# Patient Record
Sex: MALE | Race: WHITE | NOT HISPANIC OR LATINO | Employment: UNEMPLOYED | URBAN - NONMETROPOLITAN AREA
[De-identification: names, ages, dates, MRNs, and addresses within clinical notes are randomized per-mention and may not be internally consistent; named-entity substitution may affect disease eponyms.]

---

## 2024-04-15 ENCOUNTER — OFFICE VISIT (OUTPATIENT)
Dept: URGENT CARE | Facility: CLINIC | Age: 54
End: 2024-04-15
Payer: COMMERCIAL

## 2024-04-15 ENCOUNTER — APPOINTMENT (OUTPATIENT)
Dept: RADIOLOGY | Facility: CLINIC | Age: 54
End: 2024-04-15
Payer: COMMERCIAL

## 2024-04-15 VITALS
HEART RATE: 108 BPM | OXYGEN SATURATION: 97 % | SYSTOLIC BLOOD PRESSURE: 122 MMHG | BODY MASS INDEX: 40.92 KG/M2 | DIASTOLIC BLOOD PRESSURE: 82 MMHG | RESPIRATION RATE: 20 BRPM | WEIGHT: 270 LBS | HEIGHT: 68 IN | TEMPERATURE: 98.4 F

## 2024-04-15 DIAGNOSIS — M79.641 RIGHT HAND PAIN: ICD-10-CM

## 2024-04-15 DIAGNOSIS — M79.641 RIGHT HAND PAIN: Primary | ICD-10-CM

## 2024-04-15 PROCEDURE — 99204 OFFICE O/P NEW MOD 45 MIN: CPT

## 2024-04-15 PROCEDURE — S9088 SERVICES PROVIDED IN URGENT: HCPCS

## 2024-04-15 PROCEDURE — 73130 X-RAY EXAM OF HAND: CPT

## 2024-04-15 NOTE — PROGRESS NOTES
[facility]        NAME: Eric Quinn is a 53 y.o. male  : 1970    MRN: [id number]  DATE: April 15, 2024  TIME: 4:12 PM    Assessment and Plan   Right hand pain [M79.641]  1. Right hand pain  XR hand 3+ vw right    Ambulatory Referral to Orthopedic Surgery        Preliminary x-ray read negative for acute osseous abnormality, final read pending.  Unclear etiology for patient's symptoms however likely arthritic component.  Defer oral steroids at present time. OTC Tylenol for pain. Referral placed to Orthopedic Surgery. Encouraged continued supportive measures.  Follow up with PCP in 3-5 days or proceed to emergency department for worsening symptoms.  Patient verbalized understanding of instructions given.       Patient Instructions     Patient Instructions     Preliminary XR read negative, final read pending  Rest, Ice, and Elevation  OTC Tylenol as needed for pain  Referral placed to Orthopedic Surgery   Follow up with PCP in 3-5 days.  Proceed to  ER if symptoms worsen.    If tests have been performed at [facility], our office will contact you with results if changes need to be made to the care plan discussed with you at the visit.  You can review your full results on [facility] MyChart.    Arthritis   AMBULATORY CARE:   Arthritis  is pain or disease in one or more joints. There are many types of arthritis. Types such as rheumatoid arthritis cause inflammation in the joints. Other types wear away the cartilage between joints, such as osteoarthritis. This makes the bones of the joint rub together when you move the joint. An infection from bacteria, a virus, or a fungus can also cause arthritis. Your symptoms may be constant, or symptoms may come and go. Arthritis often gets worse over time and can cause permanent joint damage.  Common signs and symptoms of arthritis:   Pain, swelling, or stiffness in the joint    Limited range of motion in the joint    Warmth or redness over the  joint    Tenderness when you touch the joint    Stiff joints in the morning that loosen with movement    A creaking or grinding sound when you move the joint    Fever    Call your doctor or rheumatologist if:   You have a fever and severe joint pain or swelling.    You cannot move the affected joint.    You have severe joint pain you cannot tolerate.    You have a new or worsening rash.    Your pain or swelling does not get better with treatment.    You have questions or concerns about your condition or care.    Treatment  will depend on the type of arthritis you have and if it is severe. You may need any of the following:  Acetaminophen  decreases pain and fever. It is available without a doctor's order. Ask how much to take and how often to take it. Follow directions. Read the labels of all other medicines you are using to see if they also contain acetaminophen, or ask your doctor or pharmacist. Acetaminophen can cause liver damage if not taken correctly.    NSAIDs , such as ibuprofen, help decrease swelling, pain, and fever. This medicine is available with or without a doctor's order. NSAIDs can cause stomach bleeding or kidney problems in certain people. If you take blood thinner medicine, always ask your healthcare provider if NSAIDs are safe for you. Always read the medicine label and follow directions.    Steroid medicine  helps reduce swelling and pain.     Prescription pain medicine  may be given. Ask your healthcare provider how to take this medicine safely. Some prescription pain medicines contain acetaminophen. Do not take other medicines that contain acetaminophen without talking to your healthcare provider. Too much acetaminophen may cause liver damage. Prescription pain medicine may cause constipation. Ask your healthcare provider how to prevent or treat constipation.     Surgery  may be needed to repair or replace a damaged joint.    Manage your symptoms:   Rest your painful joint so it can heal.   Your healthcare provider may recommend crutches or a walker if the affected joint is in a leg.    Apply ice or heat to the joint.  Both can help decrease swelling and pain. Ice may also help prevent tissue damage. Use an ice pack, or put crushed ice in a plastic bag. Cover it with a towel and place it on your joint for 15 to 20 minutes every hour or as directed. You can apply heat for 20 minutes every 2 hours. Heat treatment includes hot packs or heat lamps.    Elevate your joint.  Elevation helps reduce swelling and pain. Raise your joint above the level of your heart as often as you can. Prop your painful joint on pillows to keep it above your heart comfortably.       Manage arthritis:   Talk to your healthcare providers about your arthritis medicines.  Some medicines may only be needed when you have arthritis pain. You may need to take other medicines every day to prevent arthritis from getting worse. Your healthcare providers will help you understand all your medicines and when to take them. It is important to take the medicines as directed, even if you start to feel better. You can continue to have joint damage and inflammation even if you do not feel it.    Eat a variety of healthy foods.  Healthy foods include fruits, vegetables, whole-grain breads, low-fat dairy products, beans, lean meats, and fish. Ask if you need to be on a special diet. A diet rich in calcium and vitamin D may decrease your risk of osteoporosis. Foods high in calcium include milk, cheese, broccoli, and tofu. Vitamin D may be found in meat, fish, fortified milk, cereal and bread. Ask if you need calcium or vitamin D supplements.            Go to physical or occupational therapy as directed.  A physical therapist can teach you exercises to improve flexibility and range of motion. You may also be shown non-weight-bearing exercises that are safe for your joints, such as swimming. Exercise can help keep your joints flexible and reduce pain.  An occupational therapist can help you learn to do your daily activities when your joints are stiff or sore.    Maintain a healthy weight.  Extra weight puts increased pressure on your joints. Ask your healthcare provider what you should weigh. If you need to lose weight, he or she can help you create a weight loss program. Weight loss can help reduce pain and increase your ability to do your activities.    Wear flat or low-heeled shoes.  This will help decrease pain and reduce pressure on your ankle, knee, and hip joints.    Do not smoke.  Nicotine and other chemicals in cigarettes and cigars can damage your bones and joints. Ask your healthcare provider for information if you currently smoke and need help to quit. E-cigarettes or smokeless tobacco still contain nicotine. Talk to your healthcare provider before you use these products.    Support devices:   Orthotic shoes or insoles  help support your feet when you walk.    Crutches, a cane, or a walker  may help decrease your risk for falling. They also decrease stress on affected joints.    Devices to prevent falls  include raised toilet seats and bathtub bars to help you get up from sitting. Handrails can be placed in areas where you need balance and support.         Devices to help with support and rest  include splints to wear on your hands and a firm pillow while you sleep. Use a pillow that is firm enough to support your neck and head.    Follow up with your healthcare provider or rheumatologist as directed:  Write down your questions so you remember to ask them during your visits.  © Copyright [employer] 2023 Information is for End User's use only and may not be sold, redistributed or otherwise used for commercial purposes.  The above information is an  only. It is not intended as medical advice for individual conditions or treatments. Talk to your doctor, nurse or pharmacist before following any medical regimen to see if it is safe and  "effective for you.        Chief Complaint     Chief Complaint   Patient presents with    right ankle and hand swelling     Stated his right ankle and right wrist and hand swelled up 2 days ago. No injury         History of Present Illness       53-year-old male with a past medical history significant for renal cancer status post nephrectomy presents with complaints of right hand and wrist swelling and pain x 2 days.  Denies specific injury or trauma.  No redness.  States similar symptoms in left wrist and right ankle approximately 2 weeks ago that has since resolved.  He has been taking OTC ibuprofen with some relief but not applying heat or ice.  He is right-hand dominant.        Review of Systems   Review of Systems   Constitutional:  Negative for chills and fever.   Respiratory:  Negative for cough and shortness of breath.    Cardiovascular:  Negative for chest pain.   Gastrointestinal:  Negative for abdominal pain, diarrhea, nausea and vomiting.   Musculoskeletal:  Positive for arthralgias and joint swelling.   Skin:  Negative for color change.   Neurological:  Negative for weakness and numbness.         Current Medications     No current outpatient medications on file.    Current Allergies     Allergies as of 04/15/2024    (No Known Allergies)            The following portions of the patient's history were reviewed and updated as appropriate: allergies, current medications, past family history, past medical history, past social history, past surgical history and problem list.     Past Medical History:   Diagnosis Date    Cancer (HCC)     kidney 2012    H/O cervical fracture        Past Surgical History:   Procedure Laterality Date    HERNIA REPAIR      NEPHRECTOMY Left        Family History   Problem Relation Age of Onset    No Known Problems Mother          Medications have been verified.        Objective   /82   Pulse (!) 108   Temp 98.4 °F (36.9 °C)   Resp 20   Ht 5' 8\" (1.727 m)   Wt 122 kg (270 " lb)   SpO2 97%   BMI 41.05 kg/m²   No LMP for male patient.       Physical Exam     Physical Exam  Vitals and nursing note reviewed.   Constitutional:       General: He is not in acute distress.     Appearance: He is not toxic-appearing.   HENT:      Head: Normocephalic.      Nose: Nose normal.      Mouth/Throat:      Mouth: Mucous membranes are moist.      Pharynx: Oropharynx is clear.   Eyes:      Conjunctiva/sclera: Conjunctivae normal.   Pulmonary:      Effort: Pulmonary effort is normal.   Musculoskeletal:         General: Swelling and tenderness present.      Right forearm: Normal.      Right wrist: Swelling, tenderness and bony tenderness present. Decreased range of motion. Normal pulse.      Right hand: Swelling, tenderness and bony tenderness present. Normal strength. Normal sensation.      Comments: TTP diffusely over right wrist and right hand with associated swelling, no ecchymosis or erythema. Mildly reduced ROM due to pain. NV intact.    Skin:     General: Skin is warm and dry.      Findings: No erythema.   Neurological:      Mental Status: He is alert and oriented to person, place, and time.      Sensory: Sensation is intact.      Motor: Motor function is intact.      Gait: Gait is intact.   Psychiatric:         Mood and Affect: Mood normal.         Behavior: Behavior normal.

## 2024-04-15 NOTE — PATIENT INSTRUCTIONS
Preliminary XR read negative, final read pending  Rest, Ice, and Elevation  OTC Tylenol as needed for pain  Referral placed to Orthopedic Surgery   Follow up with PCP in 3-5 days.  Proceed to  ER if symptoms worsen.    If tests have been performed at [facility], our office will contact you with results if changes need to be made to the care plan discussed with you at the visit.  You can review your full results on [facility] MyChart.    Arthritis   AMBULATORY CARE:   Arthritis  is pain or disease in one or more joints. There are many types of arthritis. Types such as rheumatoid arthritis cause inflammation in the joints. Other types wear away the cartilage between joints, such as osteoarthritis. This makes the bones of the joint rub together when you move the joint. An infection from bacteria, a virus, or a fungus can also cause arthritis. Your symptoms may be constant, or symptoms may come and go. Arthritis often gets worse over time and can cause permanent joint damage.  Common signs and symptoms of arthritis:   Pain, swelling, or stiffness in the joint    Limited range of motion in the joint    Warmth or redness over the joint    Tenderness when you touch the joint    Stiff joints in the morning that loosen with movement    A creaking or grinding sound when you move the joint    Fever    Call your doctor or rheumatologist if:   You have a fever and severe joint pain or swelling.    You cannot move the affected joint.    You have severe joint pain you cannot tolerate.    You have a new or worsening rash.    Your pain or swelling does not get better with treatment.    You have questions or concerns about your condition or care.    Treatment  will depend on the type of arthritis you have and if it is severe. You may need any of the following:  Acetaminophen  decreases pain and fever. It is available without a doctor's order. Ask how much to take and how often to take it. Follow directions. Read the labels of all  other medicines you are using to see if they also contain acetaminophen, or ask your doctor or pharmacist. Acetaminophen can cause liver damage if not taken correctly.    NSAIDs , such as ibuprofen, help decrease swelling, pain, and fever. This medicine is available with or without a doctor's order. NSAIDs can cause stomach bleeding or kidney problems in certain people. If you take blood thinner medicine, always ask your healthcare provider if NSAIDs are safe for you. Always read the medicine label and follow directions.    Steroid medicine  helps reduce swelling and pain.     Prescription pain medicine  may be given. Ask your healthcare provider how to take this medicine safely. Some prescription pain medicines contain acetaminophen. Do not take other medicines that contain acetaminophen without talking to your healthcare provider. Too much acetaminophen may cause liver damage. Prescription pain medicine may cause constipation. Ask your healthcare provider how to prevent or treat constipation.     Surgery  may be needed to repair or replace a damaged joint.    Manage your symptoms:   Rest your painful joint so it can heal.  Your healthcare provider may recommend crutches or a walker if the affected joint is in a leg.    Apply ice or heat to the joint.  Both can help decrease swelling and pain. Ice may also help prevent tissue damage. Use an ice pack, or put crushed ice in a plastic bag. Cover it with a towel and place it on your joint for 15 to 20 minutes every hour or as directed. You can apply heat for 20 minutes every 2 hours. Heat treatment includes hot packs or heat lamps.    Elevate your joint.  Elevation helps reduce swelling and pain. Raise your joint above the level of your heart as often as you can. Prop your painful joint on pillows to keep it above your heart comfortably.       Manage arthritis:   Talk to your healthcare providers about your arthritis medicines.  Some medicines may only be needed when  you have arthritis pain. You may need to take other medicines every day to prevent arthritis from getting worse. Your healthcare providers will help you understand all your medicines and when to take them. It is important to take the medicines as directed, even if you start to feel better. You can continue to have joint damage and inflammation even if you do not feel it.    Eat a variety of healthy foods.  Healthy foods include fruits, vegetables, whole-grain breads, low-fat dairy products, beans, lean meats, and fish. Ask if you need to be on a special diet. A diet rich in calcium and vitamin D may decrease your risk of osteoporosis. Foods high in calcium include milk, cheese, broccoli, and tofu. Vitamin D may be found in meat, fish, fortified milk, cereal and bread. Ask if you need calcium or vitamin D supplements.            Go to physical or occupational therapy as directed.  A physical therapist can teach you exercises to improve flexibility and range of motion. You may also be shown non-weight-bearing exercises that are safe for your joints, such as swimming. Exercise can help keep your joints flexible and reduce pain. An occupational therapist can help you learn to do your daily activities when your joints are stiff or sore.    Maintain a healthy weight.  Extra weight puts increased pressure on your joints. Ask your healthcare provider what you should weigh. If you need to lose weight, he or she can help you create a weight loss program. Weight loss can help reduce pain and increase your ability to do your activities.    Wear flat or low-heeled shoes.  This will help decrease pain and reduce pressure on your ankle, knee, and hip joints.    Do not smoke.  Nicotine and other chemicals in cigarettes and cigars can damage your bones and joints. Ask your healthcare provider for information if you currently smoke and need help to quit. E-cigarettes or smokeless tobacco still contain nicotine. Talk to your  healthcare provider before you use these products.    Support devices:   Orthotic shoes or insoles  help support your feet when you walk.    Crutches, a cane, or a walker  may help decrease your risk for falling. They also decrease stress on affected joints.    Devices to prevent falls  include raised toilet seats and bathtub bars to help you get up from sitting. Handrails can be placed in areas where you need balance and support.         Devices to help with support and rest  include splints to wear on your hands and a firm pillow while you sleep. Use a pillow that is firm enough to support your neck and head.    Follow up with your healthcare provider or rheumatologist as directed:  Write down your questions so you remember to ask them during your visits.  © Copyright [employer] 2023 Information is for End User's use only and may not be sold, redistributed or otherwise used for commercial purposes.  The above information is an  only. It is not intended as medical advice for individual conditions or treatments. Talk to your doctor, nurse or pharmacist before following any medical regimen to see if it is safe and effective for you.

## 2024-04-24 ENCOUNTER — APPOINTMENT (OUTPATIENT)
Dept: LAB | Facility: HOSPITAL | Age: 54
End: 2024-04-24
Payer: COMMERCIAL

## 2024-04-24 VITALS
DIASTOLIC BLOOD PRESSURE: 78 MMHG | BODY MASS INDEX: 41.07 KG/M2 | TEMPERATURE: 97.4 F | WEIGHT: 271 LBS | HEART RATE: 80 BPM | SYSTOLIC BLOOD PRESSURE: 120 MMHG | HEIGHT: 68 IN

## 2024-04-24 DIAGNOSIS — M19.041 ARTHRITIS OF RIGHT HAND: Primary | ICD-10-CM

## 2024-04-24 DIAGNOSIS — M25.50 POLYARTHRALGIA: ICD-10-CM

## 2024-04-24 DIAGNOSIS — M79.641 PAIN OF RIGHT HAND: ICD-10-CM

## 2024-04-24 DIAGNOSIS — R22.31 LOCALIZED SWELLING ON RIGHT HAND: ICD-10-CM

## 2024-04-24 DIAGNOSIS — M19.041 ARTHRITIS OF RIGHT HAND: ICD-10-CM
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
## 2024-04-24 LAB
ALBUMIN SERPL BCP-MCNC: 4 G/DL (ref 3.5–5)
ALP SERPL-CCNC: 77 U/L (ref 34–104)
ALT SERPL W P-5'-P-CCNC: 14 U/L (ref 7–52)
ANION GAP SERPL CALCULATED.3IONS-SCNC: 7 MMOL/L (ref 4–13)
AST SERPL W P-5'-P-CCNC: 13 U/L (ref 13–39)
BASOPHILS # BLD AUTO: 0.05 THOUSANDS/ÂΜL (ref 0–0.1)
BASOPHILS NFR BLD AUTO: 1 % (ref 0–1)
BILIRUB SERPL-MCNC: 0.59 MG/DL (ref 0.2–1)
BUN SERPL-MCNC: 18 MG/DL (ref 5–25)
CALCIUM SERPL-MCNC: 9.3 MG/DL (ref 8.4–10.2)
CHLORIDE SERPL-SCNC: 101 MMOL/L (ref 96–108)
CO2 SERPL-SCNC: 29 MMOL/L (ref 21–32)
CREAT SERPL-MCNC: 0.9 MG/DL (ref 0.6–1.3)
CRP SERPL QL: 69 MG/L
EOSINOPHIL # BLD AUTO: 0.2 THOUSAND/ÂΜL (ref 0–0.61)
EOSINOPHIL NFR BLD AUTO: 2 % (ref 0–6)
ERYTHROCYTE [DISTWIDTH] IN BLOOD BY AUTOMATED COUNT: 11.7 % (ref 11.6–15.1)
ERYTHROCYTE [SEDIMENTATION RATE] IN BLOOD: 52 MM/HOUR (ref 0–19)
GFR SERPL CREATININE-BSD FRML MDRD: 95 ML/MIN/1.73SQ M
GLUCOSE P FAST SERPL-MCNC: 100 MG/DL (ref 65–99)
HCT VFR BLD AUTO: 40.1 % (ref 36.5–49.3)
HGB BLD-MCNC: 13.3 G/DL (ref 12–17)
IMM GRANULOCYTES # BLD AUTO: 0.03 THOUSAND/UL (ref 0–0.2)
IMM GRANULOCYTES NFR BLD AUTO: 0 % (ref 0–2)
LYMPHOCYTES # BLD AUTO: 2 THOUSANDS/ÂΜL (ref 0.6–4.47)
LYMPHOCYTES NFR BLD AUTO: 20 % (ref 14–44)
MCH RBC QN AUTO: 29.6 PG (ref 26.8–34.3)
MCHC RBC AUTO-ENTMCNC: 33.2 G/DL (ref 31.4–37.4)
MCV RBC AUTO: 89 FL (ref 82–98)
MONOCYTES # BLD AUTO: 0.84 THOUSAND/ÂΜL (ref 0.17–1.22)
MONOCYTES NFR BLD AUTO: 8 % (ref 4–12)
NEUTROPHILS # BLD AUTO: 6.98 THOUSANDS/ÂΜL (ref 1.85–7.62)
NEUTS SEG NFR BLD AUTO: 69 % (ref 43–75)
NRBC BLD AUTO-RTO: 0 /100 WBCS
PLATELET # BLD AUTO: 308 THOUSANDS/UL (ref 149–390)
PMV BLD AUTO: 10.1 FL (ref 8.9–12.7)
POTASSIUM SERPL-SCNC: 4.2 MMOL/L (ref 3.5–5.3)
PROT SERPL-MCNC: 7.3 G/DL (ref 6.4–8.4)
RBC # BLD AUTO: 4.5 MILLION/UL (ref 3.88–5.62)
SODIUM SERPL-SCNC: 137 MMOL/L (ref 135–147)
TSH SERPL DL<=0.05 MIU/L-ACNC: 1.2 UIU/ML (ref 0.45–4.5)
TSH SERPL DL<=0.05 MIU/L-ACNC: 1.21 UIU/ML (ref 0.45–4.5)
WBC # BLD AUTO: 10.1 THOUSAND/UL (ref 4.31–10.16)

## 2024-04-24 PROCEDURE — 84550 ASSAY OF BLOOD/URIC ACID: CPT

## 2024-04-24 PROCEDURE — 99204 OFFICE O/P NEW MOD 45 MIN: CPT | Performed by: STUDENT IN AN ORGANIZED HEALTH CARE EDUCATION/TRAINING PROGRAM

## 2024-04-24 PROCEDURE — 36415 COLL VENOUS BLD VENIPUNCTURE: CPT

## 2024-04-24 PROCEDURE — 86140 C-REACTIVE PROTEIN: CPT

## 2024-04-24 PROCEDURE — 84443 ASSAY THYROID STIM HORMONE: CPT

## 2024-04-24 PROCEDURE — 85025 COMPLETE CBC W/AUTO DIFF WBC: CPT

## 2024-04-24 PROCEDURE — 85652 RBC SED RATE AUTOMATED: CPT

## 2024-04-24 PROCEDURE — 86430 RHEUMATOID FACTOR TEST QUAL: CPT

## 2024-04-24 PROCEDURE — 80053 COMPREHEN METABOLIC PANEL: CPT

## 2024-04-24 PROCEDURE — 86618 LYME DISEASE ANTIBODY: CPT

## 2024-04-24 PROCEDURE — 86803 HEPATITIS C AB TEST: CPT

## 2024-04-24 PROCEDURE — 86200 CCP ANTIBODY: CPT

## 2024-04-24 RX ORDER — METHYLPREDNISOLONE 4 MG/1
TABLET ORAL
Qty: 1 EACH | Refills: 0 | Status: SHIPPED | OUTPATIENT
Start: 2024-04-24

## 2024-04-24 NOTE — PROGRESS NOTES
1. Arthritis of right hand  methylPREDNISolone 4 MG tablet therapy pack    CBC and differential    Comprehensive metabolic panel    C-reactive protein    Cyclic citrul peptide antibody, IgG    Hepatitis C antibody    Sedimentation rate, automated    TSH Stimulation    RF Screen w/ Reflex to Titer    Lyme Total AB W Reflex to IGM/IGG      2. Localized swelling on right hand  Ambulatory Referral to Orthopedic Surgery    methylPREDNISolone 4 MG tablet therapy pack    CBC and differential    Comprehensive metabolic panel    C-reactive protein    Cyclic citrul peptide antibody, IgG    Hepatitis C antibody    Sedimentation rate, automated    TSH Stimulation    RF Screen w/ Reflex to Titer    Lyme Total AB W Reflex to IGM/IGG      3. Pain of right hand  methylPREDNISolone 4 MG tablet therapy pack    CBC and differential    Comprehensive metabolic panel    C-reactive protein    Cyclic citrul peptide antibody, IgG    Hepatitis C antibody    Sedimentation rate, automated    TSH Stimulation    RF Screen w/ Reflex to Titer    Lyme Total AB W Reflex to IGM/IGG      4. Polyarthralgia  methylPREDNISolone 4 MG tablet therapy pack    CBC and differential    Comprehensive metabolic panel    C-reactive protein    Cyclic citrul peptide antibody, IgG    Hepatitis C antibody    Sedimentation rate, automated    TSH Stimulation    RF Screen w/ Reflex to Titer    Lyme Total AB W Reflex to IGM/IGG      5. Tick bite, unspecified site, initial encounter          Orders Placed This Encounter   Procedures    CBC and differential    Comprehensive metabolic panel    C-reactive protein    Cyclic citrul peptide antibody, IgG    Hepatitis C antibody    Sedimentation rate, automated    RF Screen w/ Reflex to Titer    Lyme Total AB W Reflex to IGM/IGG        Imaging Studies (I personally reviewed images in PACS and report):    X-ray right hand 4/15/2024: Grossly polyarticular osteoarthritic changes of the first CMC, MCP and IP joints.  Punctate osseous  "fragment at the radial base of the third proximal fragment.    IMPRESSION:  Atraumatic right hand pain/swelling/stiffness/generalized pain. Previously has similar symptoms of left hand but this improved without intervention per patient  Migrating polyarthralgia pain and also complaining of atraumatic right ankle and left shoulder pain.  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 I have discussed with the patient the pathophysiology of this diagnosis and reviewed how the examination correlates with this diagnosis.    Imaging obtained/reviewed as per above. I will follow up official radiology interpretation.  Recommended further workup/bloodwork today as noted above.  In the interim, for pain control and improving swelling of right hand I have prescribed medrol dose pack today    Return in about 2 weeks (around 5/8/2024).    Portions of the record may have been created with voice recognition software. Occasional wrong word or \"sound a like\" substitutions may have occurred due to the inherent limitations of voice recognition software. Read the chart carefully and recognize, using context, where substitutions have occurred.     CHIEF COMPLAINT:  Chief Complaint   Patient presents with    Right Hand - Pain   Right ankle pain  Left shoulder pain      HPI:  Eric Quinn is a 53 y.o. male  who presents for       Visit 4/24/2024: :  Initial evaluation of right hand pain:  Of note patient previously was to come in for b/l hand pain, but localized to right hand. Additionally notes atraumatic right ankle and left shoulder pain over past couple days.  Denies any recent injury. " "Ongoing 2 weeks.  Reports he has sustained several tick bites over the years. Denies being tested for Lyme. Denies history of gout. Denies personal or family history of auto-immune diseases.  Denies F/C, N/V, diarrhea, diaphoresis. Denies SOB, headaches, heart racing sensation, vision changes.  In regards to pain control, he notes taking ibuprofen 800 mg daily which only mildly improves his pain.  He also has been applying ice which helps somewhat reduce the swelling.  He reports pain is of severe intensity describes as a throbbing/aching sensation.  Pain does not radiate beyond his hand and right wrist. Denies N/T of right hand  Denies prior surgeries of the right hand in the past.       Med talkical, Surgical, Family, and Social History    Past Medical History:   Diagnosis Date    Cancer (HCC)     kidney 2012    H/O cervical fracture      Past Surgical History:   Procedure Laterality Date    HERNIA REPAIR      NEPHRECTOMY Left      Social History   Social History     Substance and Sexual Activity   Alcohol Use Not Currently     Social History     Substance and Sexual Activity   Drug Use Yes    Types: Marijuana     Social History     Tobacco Use   Smoking Status Every Day    Current packs/day: 0.50    Types: Cigarettes    Passive exposure: Past   Smokeless Tobacco Never     Family History   Problem Relation Age of Onset    No Known Problems Mother      No Known Allergies       Physical Exam  /78   Pulse 80   Temp (!) 97.4 °F (36.3 °C) (Temporal)   Ht 5' 8\" (1.727 m)   Wt 123 kg (271 lb)   BMI 41.21 kg/m²     Constitutional:  see vital signs  Gen: obese, normocephalic/atraumatic, well-groomed  Eyes: No inflammation or discharge of conjunctiva or lids; sclera clear   Pulmonary/Chest: Effort normal. No respiratory distress.     Right Hand Exam     Tenderness   Right hand tenderness location: diffusely throughout wrist and metacarpals, MCP/PIP joints.    Range of Motion   Wrist   Extension:  5   Flexion:  10 "     Muscle Strength   Right wrist normal muscle strength: uanble to  due to swelling/stiffness of right hand.  Wrist extension: 5/5   Wrist flexion: 5/5     Other   Erythema: absent    Comments:  Limited but intact digit MCP/PIP/DIP motion without malrotation or digital scissoring  Thumb MCP/DIP intact  +generalized edema of hand/wrist 2+  Sensation intact in radial/median/ulnar distribution  Radial pulse brisk                Procedures

## 2024-04-25 DIAGNOSIS — R22.31 LOCALIZED SWELLING ON RIGHT HAND: Primary | ICD-10-CM

## 2024-04-25 DIAGNOSIS — M25.50 POLYARTHRALGIA: ICD-10-CM

## 2024-04-25 DIAGNOSIS — M19.041 ARTHRITIS OF RIGHT HAND: ICD-10-CM

## 2024-04-25 LAB
B BURGDOR IGG+IGM SER QL IA: NEGATIVE
HCV AB SER QL: NORMAL
RHEUMATOID FACT SER QL LA: NEGATIVE
URATE SERPL-MCNC: 4 MG/DL (ref 3.5–8.5)

## 2024-04-26 LAB — CCP AB SER IA-ACNC: 0.5

## 2024-04-27 ENCOUNTER — HOSPITAL ENCOUNTER (EMERGENCY)
Facility: HOSPITAL | Age: 54
Discharge: HOME | End: 2024-04-27
Attending: EMERGENCY MEDICINE
Payer: COMMERCIAL

## 2024-04-27 ENCOUNTER — APPOINTMENT (EMERGENCY)
Dept: NON INVASIVE DIAGNOSTICS | Facility: HOSPITAL | Age: 54
End: 2024-04-27
Payer: COMMERCIAL

## 2024-04-27 VITALS
WEIGHT: 271.2 LBS | DIASTOLIC BLOOD PRESSURE: 91 MMHG | OXYGEN SATURATION: 97 % | SYSTOLIC BLOOD PRESSURE: 155 MMHG | RESPIRATION RATE: 18 BRPM | BODY MASS INDEX: 41.24 KG/M2 | TEMPERATURE: 97.8 F | HEART RATE: 91 BPM

## 2024-04-27 DIAGNOSIS — M25.50 ARTHRALGIA: Primary | ICD-10-CM

## 2024-04-27 DIAGNOSIS — I10 HYPERTENSION: ICD-10-CM

## 2024-04-27 LAB
ANION GAP SERPL CALCULATED.3IONS-SCNC: 6 MMOL/L (ref 4–13)
BASOPHILS # BLD AUTO: 0.08 THOUSANDS/ÂΜL (ref 0–0.1)
BASOPHILS NFR BLD AUTO: 1 % (ref 0–1)
BUN SERPL-MCNC: 18 MG/DL (ref 5–25)
CALCIUM SERPL-MCNC: 9 MG/DL (ref 8.4–10.2)
CHLORIDE SERPL-SCNC: 103 MMOL/L (ref 96–108)
CO2 SERPL-SCNC: 29 MMOL/L (ref 21–32)
CREAT SERPL-MCNC: 0.77 MG/DL (ref 0.6–1.3)
EOSINOPHIL # BLD AUTO: 0.08 THOUSAND/ÂΜL (ref 0–0.61)
EOSINOPHIL NFR BLD AUTO: 1 % (ref 0–6)
ERYTHROCYTE [DISTWIDTH] IN BLOOD BY AUTOMATED COUNT: 11.9 % (ref 11.6–15.1)
GFR SERPL CREATININE-BSD FRML MDRD: 102 ML/MIN/1.73SQ M
GLUCOSE SERPL-MCNC: 161 MG/DL (ref 65–140)
HCT VFR BLD AUTO: 43.7 % (ref 36.5–49.3)
HGB BLD-MCNC: 14.5 G/DL (ref 12–17)
IMM GRANULOCYTES # BLD AUTO: 0.1 THOUSAND/UL (ref 0–0.2)
IMM GRANULOCYTES NFR BLD AUTO: 1 % (ref 0–2)
LYMPHOCYTES # BLD AUTO: 1.69 THOUSANDS/ÂΜL (ref 0.6–4.47)
LYMPHOCYTES NFR BLD AUTO: 13 % (ref 14–44)
MCH RBC QN AUTO: 29.8 PG (ref 26.8–34.3)
MCHC RBC AUTO-ENTMCNC: 33.2 G/DL (ref 31.4–37.4)
MCV RBC AUTO: 90 FL (ref 82–98)
MONOCYTES # BLD AUTO: 1.22 THOUSAND/ÂΜL (ref 0.17–1.22)
MONOCYTES NFR BLD AUTO: 10 % (ref 4–12)
NEUTROPHILS # BLD AUTO: 9.51 THOUSANDS/ÂΜL (ref 1.85–7.62)
NEUTS SEG NFR BLD AUTO: 74 % (ref 43–75)
NRBC BLD AUTO-RTO: 0 /100 WBCS
PLATELET # BLD AUTO: 370 THOUSANDS/UL (ref 149–390)
PMV BLD AUTO: 9.4 FL (ref 8.9–12.7)
POTASSIUM SERPL-SCNC: 3.9 MMOL/L (ref 3.5–5.3)
RBC # BLD AUTO: 4.87 MILLION/UL (ref 3.88–5.62)
SODIUM SERPL-SCNC: 138 MMOL/L (ref 135–147)
URATE SERPL-MCNC: 4.7 MG/DL (ref 3.5–8.5)
WBC # BLD AUTO: 12.68 THOUSAND/UL (ref 4.31–10.16)

## 2024-04-27 PROCEDURE — 80048 BASIC METABOLIC PNL TOTAL CA: CPT | Performed by: EMERGENCY MEDICINE

## 2024-04-27 PROCEDURE — 99284 EMERGENCY DEPT VISIT MOD MDM: CPT | Performed by: EMERGENCY MEDICINE

## 2024-04-27 PROCEDURE — 36415 COLL VENOUS BLD VENIPUNCTURE: CPT | Performed by: EMERGENCY MEDICINE

## 2024-04-27 PROCEDURE — 96374 THER/PROPH/DIAG INJ IV PUSH: CPT

## 2024-04-27 PROCEDURE — 85025 COMPLETE CBC W/AUTO DIFF WBC: CPT | Performed by: EMERGENCY MEDICINE

## 2024-04-27 PROCEDURE — 96375 TX/PRO/DX INJ NEW DRUG ADDON: CPT

## 2024-04-27 PROCEDURE — 93971 EXTREMITY STUDY: CPT

## 2024-04-27 PROCEDURE — 93971 EXTREMITY STUDY: CPT | Performed by: SURGERY

## 2024-04-27 PROCEDURE — 99283 EMERGENCY DEPT VISIT LOW MDM: CPT

## 2024-04-27 PROCEDURE — 84550 ASSAY OF BLOOD/URIC ACID: CPT | Performed by: EMERGENCY MEDICINE

## 2024-04-27 RX ORDER — KETOROLAC TROMETHAMINE 30 MG/ML
30 INJECTION, SOLUTION INTRAMUSCULAR; INTRAVENOUS ONCE
Status: COMPLETED | OUTPATIENT
Start: 2024-04-27 | End: 2024-04-27

## 2024-04-27 RX ORDER — CYCLOBENZAPRINE HCL 10 MG
10 TABLET ORAL 2 TIMES DAILY PRN
Qty: 20 TABLET | Refills: 0 | Status: SHIPPED | OUTPATIENT
Start: 2024-04-27

## 2024-04-27 RX ORDER — DEXAMETHASONE SODIUM PHOSPHATE 10 MG/ML
10 INJECTION, SOLUTION INTRAMUSCULAR; INTRAVENOUS ONCE
Status: COMPLETED | OUTPATIENT
Start: 2024-04-27 | End: 2024-04-27

## 2024-04-27 RX ORDER — IBUPROFEN 800 MG/1
800 TABLET ORAL 3 TIMES DAILY
Qty: 21 TABLET | Refills: 0 | Status: SHIPPED | OUTPATIENT
Start: 2024-04-27

## 2024-04-27 RX ORDER — DIPHENHYDRAMINE HYDROCHLORIDE 50 MG/ML
25 INJECTION INTRAMUSCULAR; INTRAVENOUS ONCE
Status: COMPLETED | OUTPATIENT
Start: 2024-04-27 | End: 2024-04-27

## 2024-04-27 RX ORDER — METOPROLOL SUCCINATE 25 MG/1
25 TABLET, EXTENDED RELEASE ORAL DAILY
Qty: 20 TABLET | Refills: 0 | Status: SHIPPED | OUTPATIENT
Start: 2024-04-27

## 2024-04-27 RX ADMIN — KETOROLAC TROMETHAMINE 30 MG: 30 INJECTION, SOLUTION INTRAMUSCULAR at 15:15

## 2024-04-27 RX ADMIN — DEXAMETHASONE SODIUM PHOSPHATE 10 MG: 10 INJECTION, SOLUTION INTRAMUSCULAR; INTRAVENOUS at 15:10

## 2024-04-27 RX ADMIN — MORPHINE SULFATE 2 MG: 2 INJECTION, SOLUTION INTRAMUSCULAR; INTRAVENOUS at 15:35

## 2024-04-27 RX ADMIN — DIPHENHYDRAMINE HYDROCHLORIDE 25 MG: 50 INJECTION, SOLUTION INTRAMUSCULAR; INTRAVENOUS at 15:05

## 2024-04-27 RX ADMIN — METOPROLOL TARTRATE 25 MG: 25 TABLET, FILM COATED ORAL at 16:31

## 2024-04-27 NOTE — ED PROVIDER NOTES
History  Chief Complaint   Patient presents with    Gout Pain     Pain all over and join swelling, patient given prednisone on Wednesday without relief.      This is a 53-year-old male presenting to the ED for evaluation of myalgia and joint swelling in the right upper and lower extremity.  Patient states that he was seen outpatient on Wednesday by the orthopedist and was given steroids to treat arthritis.  He states that the steroids have not helped and he is continued to have pain.  Patient states that the pain affects his gait.        Prior to Admission Medications   Prescriptions Last Dose Informant Patient Reported? Taking?   methylPREDNISolone 4 MG tablet therapy pack   No No   Sig: Use as directed on package      Facility-Administered Medications: None       Past Medical History:   Diagnosis Date    Cancer (HCC)     kidney 2012    H/O cervical fracture        Past Surgical History:   Procedure Laterality Date    HERNIA REPAIR      NEPHRECTOMY Left        Family History   Problem Relation Age of Onset    No Known Problems Mother      I have reviewed and agree with the history as documented.    E-Cigarette/Vaping    E-Cigarette Use Never User      E-Cigarette/Vaping Substances     Social History     Tobacco Use    Smoking status: Every Day     Current packs/day: 0.50     Types: Cigarettes     Passive exposure: Past    Smokeless tobacco: Never   Vaping Use    Vaping status: Never Used   Substance Use Topics    Alcohol use: Not Currently    Drug use: Yes     Types: Marijuana       Review of Systems   Constitutional:  Negative for chills and fever.   HENT:  Negative for ear pain and sore throat.    Eyes:  Negative for pain and visual disturbance.   Respiratory:  Negative for cough and shortness of breath.    Cardiovascular:  Negative for chest pain and palpitations.   Gastrointestinal:  Negative for abdominal pain and vomiting.   Genitourinary:  Negative for dysuria and hematuria.   Musculoskeletal:  Positive for  arthralgias, gait problem, joint swelling and myalgias. Negative for back pain.   Skin:  Negative for color change and rash.   Neurological:  Negative for seizures and syncope.   All other systems reviewed and are negative.      Physical Exam  Physical Exam  Vitals and nursing note reviewed.   Constitutional:       General: He is in acute distress.      Appearance: Normal appearance. He is well-developed. He is obese.   HENT:      Head: Normocephalic and atraumatic.      Right Ear: External ear normal.      Left Ear: External ear normal.      Nose: Nose normal.      Mouth/Throat:      Mouth: Mucous membranes are moist.      Pharynx: Posterior oropharyngeal erythema present.   Eyes:      Extraocular Movements: Extraocular movements intact.      Conjunctiva/sclera: Conjunctivae normal.   Neck:      Vascular: No carotid bruit.   Cardiovascular:      Rate and Rhythm: Normal rate and regular rhythm.      Pulses: Normal pulses.      Heart sounds: Normal heart sounds. No murmur heard.  Pulmonary:      Effort: Pulmonary effort is normal. No respiratory distress.      Breath sounds: No stridor. No wheezing, rhonchi or rales.   Chest:      Chest wall: No tenderness.   Abdominal:      General: Abdomen is flat. Bowel sounds are normal. There is no distension.      Palpations: Abdomen is soft. There is no mass.      Tenderness: There is no abdominal tenderness.      Hernia: No hernia is present.   Musculoskeletal:         General: Swelling and tenderness present.      Cervical back: Normal range of motion and neck supple. No rigidity or tenderness.      Comments: Marked swelling to the right upper and lower extremity   Lymphadenopathy:      Cervical: No cervical adenopathy.   Skin:     General: Skin is warm and dry.      Capillary Refill: Capillary refill takes less than 2 seconds.   Neurological:      General: No focal deficit present.      Mental Status: He is alert and oriented to person, place, and time. Mental status is at  baseline.   Psychiatric:         Mood and Affect: Mood normal.         Vital Signs  ED Triage Vitals [04/27/24 1326]   Temperature Pulse Respirations Blood Pressure SpO2   97.8 °F (36.6 °C) 88 18 (!) 146/101 99 %      Temp Source Heart Rate Source Patient Position - Orthostatic VS BP Location FiO2 (%)   Temporal Monitor Sitting Left arm --      Pain Score       10 - Worst Possible Pain           Vitals:    04/27/24 1326 04/27/24 1522   BP: (!) 146/101 (!) 188/95   Pulse: 88 86   Patient Position - Orthostatic VS: Sitting Lying         Visual Acuity      ED Medications  Medications   dexamethasone (PF) (DECADRON) injection 10 mg (10 mg Intravenous Given 4/27/24 1510)   diphenhydrAMINE (BENADRYL) injection 25 mg (25 mg Intravenous Given 4/27/24 1505)   ketorolac (TORADOL) injection 30 mg (30 mg Intravenous Given 4/27/24 1515)   morphine injection 2 mg (2 mg Intravenous Given 4/27/24 1535)       Diagnostic Studies  Results Reviewed       Procedure Component Value Units Date/Time    Uric acid [[id number]]  (Normal) Collected: 04/27/24 1502    Lab Status: Final result Specimen: Blood from Arm, Right Updated: 04/27/24 1519     Uric Acid 4.7 mg/dL     Narrative:      N-acetyl-p-benzoquinone imine (metabolite of Acetaminophen) will generate erroneously low results in samples for patients that have taken an overdose of Acetaminophen.    Basic metabolic panel [[id number]]  (Abnormal) Collected: 04/27/24 1502    Lab Status: Final result Specimen: Blood from Arm, Right Updated: 04/27/24 1519     Sodium 138 mmol/L      Potassium 3.9 mmol/L      Chloride 103 mmol/L      CO2 29 mmol/L      ANION GAP 6 mmol/L      BUN 18 mg/dL      Creatinine 0.77 mg/dL      Glucose 161 mg/dL      Calcium 9.0 mg/dL      eGFR 102 ml/min/1.73sq m     Narrative:      National Kidney Disease Foundation guidelines for Chronic Kidney Disease (CKD):     Stage 1 with normal or high GFR (GFR > 90 mL/min/1.73 square meters)    Stage 2 Mild CKD (GFR = 60-89  mL/min/1.73 square meters)    Stage 3A Moderate CKD (GFR = 45-59 mL/min/1.73 square meters)    Stage 3B Moderate CKD (GFR = 30-44 mL/min/1.73 square meters)    Stage 4 Severe CKD (GFR = 15-29 mL/min/1.73 square meters)    Stage 5 End Stage CKD (GFR <15 mL/min/1.73 square meters)  Note: GFR calculation is accurate only with a steady state creatinine    CBC and differential [[id number]]  (Abnormal) Collected: 04/27/24 1502    Lab Status: Final result Specimen: Blood from Arm, Right Updated: 04/27/24 1508     WBC 12.68 Thousand/uL      RBC 4.87 Million/uL      Hemoglobin 14.5 g/dL      Hematocrit 43.7 %      MCV 90 fL      MCH 29.8 pg      MCHC 33.2 g/dL      RDW 11.9 %      MPV 9.4 fL      Platelets 370 Thousands/uL      nRBC 0 /100 WBCs      Segmented % 74 %      Immature Grans % 1 %      Lymphocytes % 13 %      Monocytes % 10 %      Eosinophils Relative 1 %      Basophils Relative 1 %      Absolute Neutrophils 9.51 Thousands/µL      Absolute Immature Grans 0.10 Thousand/uL      Absolute Lymphocytes 1.69 Thousands/µL      Absolute Monocytes 1.22 Thousand/µL      Eosinophils Absolute 0.08 Thousand/µL      Basophils Absolute 0.08 Thousands/µL                    VAS VENOUS DUPLEX -LOWER LIMB UNILATERAL    (Results Pending)              Procedures  Procedures         ED Course  ED Course as of 04/27/24 1611   Sat Apr 27, 2024   1611 Patient has stable ED course without any acute findings.  Uric acid was normal.  He was given pain medications and steroids here he is being discharged for outpatient follow-up.                               SBIRT 20yo+      Flowsheet Row Most Recent Value   Initial Alcohol Screen: US AUDIT-C     1. How often do you have a drink containing alcohol? 0 Filed at: 04/27/2024 1327   2. How many drinks containing alcohol do you have on a typical day you are drinking?  0 Filed at: 04/27/2024 1327   3a. Male UNDER 65: How often do you have five or more drinks on one occasion? 0 Filed at: 04/27/2024  1327   Audit-C Score 0 Filed at: 04/27/2024 1327   TEO: How many times in the past year have you...    Used an illegal drug or used a prescription medication for non-medical reasons? Never Filed at: 04/27/2024 1327                      Medical Decision Making  Differential diagnosis includes but not limited to: Arthralgia, DVT, insect bite, allergic reaction    Amount and/or Complexity of Data Reviewed  Labs: ordered.    Risk  Prescription drug management.             Disposition  Final diagnoses:   Arthralgia     Time reflects when diagnosis was documented in both MDM as applicable and the Disposition within this note       Time User Action Codes Description Comment    4/27/2024  3:29 PM Karla Gu Add [M25.50] Arthralgia           ED Disposition       ED Disposition   Discharge    Condition   Stable    Date/Time   Sat Apr 27, 2024 1520    Comment   Eric Quinn discharge to home/self care.                   Follow-up Information       Follow up With Specialties Details Why Contact Info    [place] [id number]  [phone]            Patient's Medications   Discharge Prescriptions    CYCLOBENZAPRINE (FLEXERIL) 10 MG TABLET    Take 1 tablet (10 mg total) by mouth 2 (two) times a day as needed for muscle spasms       Start Date: 4/27/2024 End Date: --       Order Dose: 10 mg       Quantity: 20 tablet    Refills: 0    IBUPROFEN (MOTRIN) 800 MG TABLET    Take 1 tablet (800 mg total) by mouth 3 (three) times a day       Start Date: 4/27/2024 End Date: --       Order Dose: 800 mg       Quantity: 21 tablet    Refills: 0       No discharge procedures on file.    PDMP Review       None            ED Provider  Electronically Signed by             Karla Gu DO  04/27/24 8193

## 2024-05-15 ENCOUNTER — OFFICE VISIT (OUTPATIENT)
Dept: OBGYN CLINIC | Facility: CLINIC | Age: 54
End: 2024-05-15
Payer: COMMERCIAL

## 2024-05-15 ENCOUNTER — HOSPITAL ENCOUNTER (OUTPATIENT)
Dept: RADIOLOGY | Facility: CLINIC | Age: 54
Discharge: HOME | End: 2024-05-15
Payer: COMMERCIAL

## 2024-05-15 VITALS
BODY MASS INDEX: 41.07 KG/M2 | SYSTOLIC BLOOD PRESSURE: 130 MMHG | WEIGHT: 271 LBS | TEMPERATURE: 97.9 F | HEART RATE: 84 BPM | HEIGHT: 68 IN | DIASTOLIC BLOOD PRESSURE: 80 MMHG

## 2024-05-15 DIAGNOSIS — M19.041 ARTHRITIS OF RIGHT HAND: ICD-10-CM

## 2024-05-15 DIAGNOSIS — M25.471 ANKLE SWELLING, RIGHT: ICD-10-CM

## 2024-05-15 DIAGNOSIS — M25.60 JOINT STIFFNESS: ICD-10-CM

## 2024-05-15 DIAGNOSIS — M25.50 POLYARTHRALGIA: ICD-10-CM

## 2024-05-15 DIAGNOSIS — M79.641 PAIN OF RIGHT HAND: ICD-10-CM

## 2024-05-15 DIAGNOSIS — M25.571 ACUTE RIGHT ANKLE PAIN: ICD-10-CM

## 2024-05-15 DIAGNOSIS — R70.0 ELEVATED ERYTHROCYTE SEDIMENTATION RATE: ICD-10-CM

## 2024-05-15 DIAGNOSIS — M19.071 ARTHRITIS OF RIGHT ANKLE: ICD-10-CM

## 2024-05-15 DIAGNOSIS — R22.31 LOCALIZED SWELLING ON RIGHT HAND: ICD-10-CM

## 2024-05-15 DIAGNOSIS — M25.571 ACUTE RIGHT ANKLE PAIN: Primary | ICD-10-CM

## 2024-05-15 PROCEDURE — 99214 OFFICE O/P EST MOD 30 MIN: CPT | Performed by: STUDENT IN AN ORGANIZED HEALTH CARE EDUCATION/TRAINING PROGRAM

## 2024-05-15 PROCEDURE — 20605 DRAIN/INJ JOINT/BURSA W/O US: CPT | Performed by: STUDENT IN AN ORGANIZED HEALTH CARE EDUCATION/TRAINING PROGRAM

## 2024-05-15 PROCEDURE — 73610 X-RAY EXAM OF ANKLE: CPT

## 2024-05-15 RX ORDER — BUPIVACAINE HYDROCHLORIDE 2.5 MG/ML
0.5 INJECTION, SOLUTION INFILTRATION; PERINEURAL
Status: COMPLETED | OUTPATIENT
Start: 2024-05-15 | End: 2024-05-15

## 2024-05-15 RX ORDER — TRIAMCINOLONE ACETONIDE 40 MG/ML
20 INJECTION, SUSPENSION INTRA-ARTICULAR; INTRAMUSCULAR
Status: COMPLETED | OUTPATIENT
Start: 2024-05-15 | End: 2024-05-15

## 2024-05-15 RX ORDER — MELOXICAM 15 MG/1
15 TABLET ORAL DAILY
Qty: 30 TABLET | Refills: 1 | Status: SHIPPED | OUTPATIENT
Start: 2024-05-15

## 2024-05-15 RX ADMIN — TRIAMCINOLONE ACETONIDE 20 MG: 40 INJECTION, SUSPENSION INTRA-ARTICULAR; INTRAMUSCULAR at 13:15

## 2024-05-15 RX ADMIN — BUPIVACAINE HYDROCHLORIDE 0.5 ML: 2.5 INJECTION, SOLUTION INFILTRATION; PERINEURAL at 13:15

## 2024-05-15 NOTE — PROGRESS NOTES
1. Acute right ankle pain  XR ankle 3+ vw right    meloxicam (Mobic) 15 mg tablet    Medium joint arthrocentesis: R ankle      2. Ankle swelling, right  XR ankle 3+ vw right    Ambulatory Referral to Rheumatology    meloxicam (Mobic) 15 mg tablet    Medium joint arthrocentesis: R ankle      3. Polyarthralgia  Ambulatory Referral to Rheumatology    meloxicam (Mobic) 15 mg tablet      4. Localized swelling on right hand  MRI hand right wo contrast    Ambulatory Referral to Rheumatology    meloxicam (Mobic) 15 mg tablet      5. Pain of right hand  MRI hand right wo contrast    meloxicam (Mobic) 15 mg tablet      6. Arthritis of right hand  MRI hand right wo contrast    meloxicam (Mobic) 15 mg tablet      7. Elevated erythrocyte sedimentation rate  MRI hand right wo contrast    Ambulatory Referral to Rheumatology    meloxicam (Mobic) 15 mg tablet      8. Joint stiffness  Ambulatory Referral to Rheumatology    meloxicam (Mobic) 15 mg tablet    Medium joint arthrocentesis: R ankle      9. Arthritis of right ankle  Medium joint arthrocentesis: R ankle          Orders Placed This Encounter   Procedures    Medium joint arthrocentesis: R ankle    XR ankle 3+ vw right    MRI hand right wo contrast    Ambulatory Referral to Rheumatology        Imaging Studies (I personally reviewed images in PACS and report):    X-ray right ankle 5/15/2024: No acute osseous abnormalities. Moderate OA changes of the medial tibiotalar joint space; osteophytes/chronic appearing fragments around inferior aspect of tibia. Medial ankle soft tissue swelling.    Venous duplex RLE 4/27/2024:   No evidence of acute or chronic deep vein thrombosis.  No evidence of superficial thrombophlebitis noted.  Doppler evaluation shows a normal response to augmentation maneuvers.  Popliteal, posterior tibial and anterior tibial arterial Doppler waveform's are  triphasic.    X-ray right hand 4/15/2024: Polyarticular osteoarthritic changes of the first CMC, MCP and IP  joints diffusely.  Punctate osseous fragment at the radial base of the third proximal fragment. Soft tissues unremarkable      Labs:   Latest Reference Range & Units 04/24/24 13:41   Sodium 135 - 147 mmol/L 137   Potassium 3.5 - 5.3 mmol/L 4.2   Chloride 96 - 108 mmol/L 101   Carbon Dioxide 21 - 32 mmol/L 29   ANION GAP 4 - 13 mmol/L 7   BUN 5 - 25 mg/dL 18   Creatinine 0.60 - 1.30 mg/dL 0.90   GLUCOSE, FASTING 65 - 99 mg/dL 100 (H)   Calcium 8.4 - 10.2 mg/dL 9.3   AST 13 - 39 U/L 13   ALT 7 - 52 U/L 14   ALK PHOS 34 - 104 U/L 77   Total Protein 6.4 - 8.4 g/dL 7.3   Albumin 3.5 - 5.0 g/dL 4.0   Total Bilirubin 0.20 - 1.00 mg/dL 0.59   GFR, Calculated ml/min/1.73sq m 95   WBC 4.31 - 10.16 Thousand/uL 10.10   RBC 3.88 - 5.62 Million/uL 4.50   Hemoglobin 12.0 - 17.0 g/dL 13.3   Hematocrit 36.5 - 49.3 % 40.1   MCV 82 - 98 fL 89   MCH 26.8 - 34.3 pg 29.6   MCHC 31.4 - 37.4 g/dL 33.2   RDW 11.6 - 15.1 % 11.7   Platelet Count 149 - 390 Thousands/uL 308   MPV 8.9 - 12.7 fL 10.1   nRBC /100 WBCs 0   Segmented % 43 - 75 % 69   Lymphocytes % 14 - 44 % 20   Monocytes % 4 - 12 % 8   Eosinophils % 0 - 6 % 2   Basophils % 0 - 1 % 1   Immature Grans % 0 - 2 % 0   Absolute Immature Grans 0.00 - 0.20 Thousand/uL 0.03   Absolute Neutrophils 1.85 - 7.62 Thousands/µL 6.98   Absolute Lymphocytes 0.60 - 4.47 Thousands/µL 2.00   Absolute Monocytes 0.17 - 1.22 Thousand/µL 0.84   Absolute Eosinophils 0.00 - 0.61 Thousand/µL 0.20   Absolute Basophils 0.00 - 0.10 Thousands/µL 0.05   Sed Rate 0 - 19 mm/hour 52 (H)   TSH 3RD GENERATON 0.450 - 4.500 uIU/mL 1.204   TSH Baseline 0.450 - 4.500 uIU/mL 1.210   CYCLIC CITRULLINATED PEPTIDE ANTIBODY See comment  0.5   RHEUMATOID FACTOR Negative  Negative   C-REACTIVE PROTEIN <3.0 mg/L 69.0 (H)   HEP C AB Non-Reactive  Non-reactive   Lyme total antibody Negative  Negative   URIC ACID 3.5 - 8.5 mg/dL 4.0   (H): Data is abnormally high    IMPRESSION:  Atraumatic bilateral hand  pain/swelling/stiffness/generalized pain.  Left hand pain resolved without intervention but now pain is localized to his right general hand/wrist/digits.  In addition, he reports other atraumatic polyarthralgia pain and also complaining of atraumatic right ankle pain and swelling. Previously c/o left shoulder but this resolved  Ongoing past 4 weeks  Radiographs of right hand noting generalized mild arthritic changes.  Reports history of tick bites  Labwork revealing elevated nonspecific inflammatory markers of ESR/CRP but otherwise has been unremarkable for testing as above   Reports no relief from prednisone; currently on nsaids and muscle relaxers from ER which mildly reduces pain/swelling but continue right hand and right ankle pain.  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 I have discussed with the patient the pathophysiology of this diagnosis and reviewed how the examination correlates with this diagnosis.    Labs reviewed as per above. Discussed that there were elevated inflammatory markers in his bloodwork but no potential source could be determined as above.  MRI of right hand w/o contrast ordered given no improvement of pain/function of right hand.  In regards to right ankle, imaging obtained as per above. I will follow up official radiology interpretation. There is noted primary osteoarthritic changes and I did offer a diagnostic/therapeutic CSI of his right intra-articular ankle. After reviewing risks/benefits, he was agreeable to procedure as per below.   Furthermore, referred to Rheumatology for second opinion/further evaluation to determine source of  "polyarthralgia/generalized joint stiffness.  In regards to pain control, prescribed meloxicam 15mg PO QD. Advised to not concurrently take NSAIDs/ibuprofen with meloxicam due to risk of GI bleed. Counseled he can use acetaminophen, heat/ice treatments as needed    Return for Follow up after MRI of right hand; follow up with rheumatology as well.    Portions of the record may have been created with voice recognition software. Occasional wrong word or \"sound a like\" substitutions may have occurred due to the inherent limitations of voice recognition software. Read the chart carefully and recognize, using context, where substitutions have occurred.     CHIEF COMPLAINT:  Follow up polyarthralgia    HPI:  Eric Quinn is a 53 y.o. male  who presents for     Visit 5/15/2024:   Follow-up evaluation of atraumatic right hand pain/polyarthralgia  Lab work was obtained since last visit and reviewed as noted above (only ESR and CRP noted to be elevated)  Patient did also go to the ER as well due to no improvement from oral prednisone. Reports use of ibuprofen 800mg which mildly reduces intensity of pain but has to take daily. He has also been using ice but does not feel is reduces swelling. Reports unsure whether muscle relaxers alleviating symptoms  Patient reports today that the swelling of his hand slightly receding. However, he continues to have generalized hand/palmar pain, stiffness  Additionally continues to have swelling/stiffness/pain of his right ankle; reports today that he has sustained injuries of his right ankle in the past but denies surgery  Shoulder pain improved. However, he reports continues polyarthralgia stiffness every morning  Denies new injuries since last visit    Visit 4/24/2024: :  Initial evaluation of right hand pain:  Of note patient previously was to come in for b/l hand pain, but left hand/wrist resovled without intervention and pain localized to right hand. Additionally notes atraumatic " "right ankle and left shoulder pain over past couple days.  Denies any recent injury. Overall issues for about 2 weeks.  Reports he has sustained several tick bites over the years. Denies being tested for Lyme. Denies history of gout. Denies personal or family history of auto-immune diseases.  Denies F/C, N/V, diarrhea, diaphoresis. Denies SOB, headaches, heart racing sensation, vision changes.  In regards to pain control, he notes taking ibuprofen 800 mg daily which only mildly improves his pain.  He also has been applying ice which helps somewhat reduce the swelling.  He reports pain is of severe intensity describes as a throbbing/aching sensation.  Pain does not radiate beyond his hand and right wrist. Denies N/T of right hand  Denies prior surgeries of the right hand in the past.       Med talkical, Surgical, Family, and Social History    Past Medical History:   Diagnosis Date    Cancer (HCC)     kidney 2012    H/O cervical fracture      Past Surgical History:   Procedure Laterality Date    HERNIA REPAIR      NEPHRECTOMY Left      Social History   Social History     Substance and Sexual Activity   Alcohol Use Not Currently     Social History     Substance and Sexual Activity   Drug Use Yes    Types: Marijuana     Social History     Tobacco Use   Smoking Status Every Day    Current packs/day: 0.50    Types: Cigarettes    Passive exposure: Past   Smokeless Tobacco Never     Family History   Problem Relation Age of Onset    No Known Problems Mother      No Known Allergies       Physical Exam  /80   Pulse 84   Temp 97.9 °F (36.6 °C) (Temporal)   Ht 5' 8\" (1.727 m)   Wt 123 kg (271 lb)   BMI 41.21 kg/m²     Constitutional:  see vital signs  Gen: obese, normocephalic/atraumatic, well-groomed  Eyes: No inflammation or discharge of conjunctiva or lids; sclera clear   Pulmonary/Chest: Effort normal. No respiratory distress.     Right Hand Exam     Tenderness   Right hand tenderness location: Diffusely " throughout dorsum of hand/metacarpal/carpal bones.    Range of Motion   Wrist   Extension:  5   Flexion:  10     Muscle Strength   Right wrist normal muscle strength: Limited  strength due to aggravated hand pain/stiffness of right hand.  Wrist extension: 5/5   Wrist flexion: 5/5   : 4/5     Other   Erythema: absent    Comments:  Limited but intact digit MCP/PIP/DIP motion without malrotation or digital scissoring  Thumb MCP/DIP intact  +generalized edema of hand/wrist   Sensation intact in radial/median/ulnar distribution  Radial pulse brisk             Ankle Examination (focused):     Gait: antalgic gait with use of cane      RIGHT   Inspection Erythema none    Edema 2+    Ecchymosis none        ROM:  Plantarflexion From neutral position; can plantarflex about 20 degrees    Dorsiflexion From neutral, can dorsiflex about 10 degrees        Strength Pronation 5/5    Supination 5/5    Foot plantarflexion 5/5    Foot dorsflexion 5/5        TTP  General medial aspect of ankle. Nontender over metatarsals, lateral ankle, calcaneus/platnar fascia/achilles        Anterior Drawer ATFL negative   Calcaneal Squeeze  negative   Tib-Fib Squeeze Test  negative   Talar Tilt (stab tib,DF foot,invert foot) CFL negative   ER Stress (stab tib,ER foot) High ankle negative   Eversion stress (stab tib, adán foot) deltoid negative   MT Compression  negative         No calf tenderness to palpation     LE NV Exam: +2 DP/PT pulses               Medium joint arthrocentesis: R ankle  Universal Protocol:  Consent: Verbal consent obtained.  Risks and benefits: risks, benefits and alternatives were discussed  Consent given by: patient  Patient understanding: patient states understanding of the procedure being performed  Site marked: the operative site was marked  Radiology Images displayed and confirmed. If images not available, report reviewed: imaging studies available  Patient identity confirmed: verbally with patient  Supporting  Documentation  Indications: pain and diagnostic evaluation   Procedure Details  Location: ankle - R ankle  Preparation: Patient was prepped and draped in the usual sterile fashion  Needle size: 22 G  Ultrasound guidance: no  Approach: anteromedial  Medications administered: 20 mg triamcinolone acetonide 40 mg/mL; 0.5 mL bupivacaine 0.25 %    Patient tolerance: patient tolerated the procedure well with no immediate complications  Dressing:  Sterile dressing applied

## 2024-05-16 ENCOUNTER — HOSPITAL ENCOUNTER (EMERGENCY)
Facility: HOSPITAL | Age: 54
Discharge: HOME | End: 2024-05-16
Attending: EMERGENCY MEDICINE
Payer: COMMERCIAL

## 2024-05-16 VITALS
WEIGHT: 251.32 LBS | TEMPERATURE: 99 F | HEART RATE: 97 BPM | HEIGHT: 68 IN | BODY MASS INDEX: 38.09 KG/M2 | RESPIRATION RATE: 18 BRPM | DIASTOLIC BLOOD PRESSURE: 84 MMHG | OXYGEN SATURATION: 98 % | SYSTOLIC BLOOD PRESSURE: 128 MMHG

## 2024-05-16 DIAGNOSIS — M25.571 CHRONIC PAIN OF RIGHT ANKLE: Primary | ICD-10-CM

## 2024-05-16 DIAGNOSIS — G89.29 CHRONIC PAIN OF RIGHT ANKLE: Primary | ICD-10-CM

## 2024-05-16 PROCEDURE — 99284 EMERGENCY DEPT VISIT MOD MDM: CPT | Performed by: EMERGENCY MEDICINE

## 2024-05-16 PROCEDURE — 99282 EMERGENCY DEPT VISIT SF MDM: CPT

## 2024-05-16 PROCEDURE — 96372 THER/PROPH/DIAG INJ SC/IM: CPT

## 2024-05-16 RX ORDER — HYDROMORPHONE HCL/PF 1 MG/ML
1 SYRINGE (ML) INJECTION ONCE
Status: DISCONTINUED | OUTPATIENT
Start: 2024-05-16 | End: 2024-05-16

## 2024-05-16 RX ORDER — HYDROMORPHONE HCL/PF 1 MG/ML
1 SYRINGE (ML) INJECTION ONCE
Status: COMPLETED | OUTPATIENT
Start: 2024-05-16 | End: 2024-05-16

## 2024-05-16 RX ADMIN — HYDROMORPHONE HYDROCHLORIDE 1 MG: 1 INJECTION, SOLUTION INTRAMUSCULAR; INTRAVENOUS; SUBCUTANEOUS at 05:20

## 2024-05-16 NOTE — DISCHARGE INSTRUCTIONS
There were no acute emergent findings found in the emergency department this morning.  You should contact sports medicine later today if you have continued issues with relation to the procedure performed yesterday.

## 2024-05-16 NOTE — ED NOTES
Pt called brother for ride and verbalized his brother is coming to pick him up from the waiting room. Pt assisted to waiting room via wheelchair and is waiting for brother to pick him up.     Gisele Toth RN  05/16/24 0601       Gisele Toth RN  05/16/24 0606

## 2024-05-16 NOTE — ED PROVIDER NOTES
History  Chief Complaint   Patient presents with    Foot Pain     Pain in right foot after receiving injection by MD into foot yesterday. Pt also reports swelling in right hand      53-year-old to the emergency department complaint of right ankle pain after receiving a steroid injection yesterday.  Patient was seen and evaluated by sports medicine.  Had imaging studies of the right ankle which were negative for any acute findings.  Patient reported that he was having pain in his foot so he presented back to the emergency room today.  Patient has had chronic pain in the right foot.  Patient reports that the in swelling in both lower extremities in both hands has been ongoing for an extended period of time.  Patient has been evaluated for various types of joint pain since at least April of this year.      History provided by:  Patient  Ankle Pain  Location:  Ankle  Pain details:     Quality:  Aching    Timing:  Sporadic    Progression:  Waxing and waning  Associated symptoms: stiffness and swelling    Associated symptoms: no fever, no itching, no muscle weakness, no numbness and no tingling        Prior to Admission Medications   Prescriptions Last Dose Informant Patient Reported? Taking?   cyclobenzaprine (FLEXERIL) 10 mg tablet   No No   Sig: Take 1 tablet (10 mg total) by mouth 2 (two) times a day as needed for muscle spasms   ibuprofen (MOTRIN) 800 mg tablet   No No   Sig: Take 1 tablet (800 mg total) by mouth 3 (three) times a day   meloxicam (Mobic) 15 mg tablet   No No   Sig: Take 1 tablet (15 mg total) by mouth daily   metoprolol succinate (TOPROL-XL) 25 mg 24 hr tablet   No No   Sig: Take 1 tablet (25 mg total) by mouth daily      Facility-Administered Medications Last Administration Doses Remaining   bupivacaine (MARCAINE) 0.25 % injection 0.5 mL 5/15/2024  1:15 PM 0   triamcinolone acetonide (KENALOG-40) 40 mg/mL injection 20 mg 5/15/2024  1:15 PM 0          Past Medical History:   Diagnosis Date    Cancer  ([facility])     kidney 2012    H/O cervical fracture        Past Surgical History:   Procedure Laterality Date    HERNIA REPAIR      NEPHRECTOMY Left        Family History   Problem Relation Age of Onset    No Known Problems Mother      I have reviewed and agree with the history as documented.    E-Cigarette/Vaping    E-Cigarette Use Never User      E-Cigarette/Vaping Substances     Social History     Tobacco Use    Smoking status: Every Day     Current packs/day: 0.50     Types: Cigarettes     Passive exposure: Past    Smokeless tobacco: Never   Vaping Use    Vaping status: Never Used   Substance Use Topics    Alcohol use: Not Currently    Drug use: Yes     Types: Marijuana       Review of Systems   Constitutional:  Negative for fever.   Respiratory:  Negative for shortness of breath.    Cardiovascular:  Negative for chest pain.   Gastrointestinal:  Negative for nausea and vomiting.   Musculoskeletal:  Positive for arthralgias, gait problem, joint swelling and stiffness. Negative for myalgias.   Skin:  Negative for itching.       Physical Exam  Physical Exam  Vitals (Patient is laughing and making jokes with nursing staff.  No acute distress.) and nursing note reviewed.   Constitutional:       General: He is not in acute distress.     Appearance: He is obese. He is not ill-appearing or toxic-appearing.   HENT:      Head: Normocephalic and atraumatic.      Right Ear: External ear normal.      Left Ear: External ear normal.      Nose: Nose normal.      Mouth/Throat:      Mouth: Mucous membranes are moist.   Cardiovascular:      Rate and Rhythm: Tachycardia present.   Pulmonary:      Effort: Pulmonary effort is normal. No respiratory distress.   Abdominal:      General: Abdomen is flat. There is no distension.   Musculoskeletal:         General: Swelling and tenderness present. No deformity or signs of injury.      Right lower leg: Edema present.      Left lower leg: Edema present.      Comments: Bilateral lower extremity  swelling.  No disparity.  Site of injection on right ankle is not draining.  It is not erythematous it is not warm.  There is no evidence of infection.   Skin:     Coloration: Skin is not pale.   Neurological:      Mental Status: He is alert.   Psychiatric:         Mood and Affect: Mood is anxious.         Speech: Speech is rapid and pressured.         Vital Signs  ED Triage Vitals [05/16/24 0512]   Temperature Pulse Respirations Blood Pressure SpO2   99.7 °F (37.6 °C) 105 20 140/96 98 %      Temp Source Heart Rate Source Patient Position - Orthostatic VS BP Location FiO2 (%)   Temporal Monitor Sitting Left arm --      Pain Score       8           Vitals:    05/16/24 0512   BP: 140/96   Pulse: 105   Patient Position - Orthostatic VS: Sitting         Visual Acuity      ED Medications  Medications   HYDROmorphone (DILAUDID) injection 1 mg (1 mg Intramuscular Given 5/16/24 0520)       Diagnostic Studies  Results Reviewed       None                   No orders to display              Procedures  Procedures         ED Course  ED Course as of 05/16/24 0548   Thu May 16, 2024   0513 Patient had extensive evaluation and workup by sports medicine yesterday.  Had arthrocentesis with subsequent injection with bupivacaine and steroids.  Patient is being referred for outpatient MRI of his right hand.  He is being referred to rheumatology.  He presented today seeking additional pain relief after being prescribed meloxicam.   0520 Patient had imaging studies of the right ankle performed yesterday.  They show degenerative changes.  No acute findings.                               SBIRT 20yo+      Flowsheet Row Most Recent Value   Initial Alcohol Screen: US AUDIT-C     1. How often do you have a drink containing alcohol? 0 Filed at: 05/16/2024 0512   2. How many drinks containing alcohol do you have on a typical day you are drinking?  0 Filed at: 05/16/2024 0512   3a. Male UNDER 65: How often do you have five or more drinks on one  occasion? 0 Filed at: 05/16/2024 0512   3b. FEMALE Any Age, or MALE 65+: How often do you have 4 or more drinks on one occassion? 0 Filed at: 05/16/2024 0512   Audit-C Score 0 Filed at: 05/16/2024 0512   TEO: How many times in the past year have you...    Used an illegal drug or used a prescription medication for non-medical reasons? Never Filed at: 05/16/2024 0512                      Medical Decision Making  Patient presented to the emergency department and a MSE was performed. The patient was evaluated for complaint related to acute exacerbation of chronic joint pain.  Patient is potentially at risk for, but not limited to, strain, sprain, fracture, dislocation or ligamentous disruption.  Several of these diagnoses have been evaluated and ruled out by history and physical.  As needed, patient will be further evaluated with laboratory and imaging studies.  Higher level diagnostics, such as CT imaging or ultrasound, may also be required.  Please see work-up portion of the note for further evaluation of patient's risk.  Socioeconomic factors were also considered as part of the decision-making process.  Unless otherwise stated in the chart or patient is admitted as elsewhere documented, any previously prescribed medications will be maintained.      Problems Addressed:  Chronic pain of right ankle: chronic illness or injury with exacerbation, progression, or side effects of treatment    Risk  Prescription drug management.             Disposition  Final diagnoses:   Chronic pain of right ankle     Time reflects when diagnosis was documented in both MDM as applicable and the Disposition within this note       Time User Action Codes Description Comment    5/16/2024  5:25 AM Joseph Pritchard Add [M25.571,  G89.29] Chronic pain of right ankle           ED Disposition       ED Disposition   Discharge    Condition   Stable    Date/Time   u May 16, 2024 9308    Comment   Eric Quinn discharge to home/self care.                    Follow-up Information       Follow up With Specialties Details Why Contact Info    Harish Villar MD Orthopedic Surgery, Sports Medicine Today  [address] [id number] [phone]              Patient's Medications   Discharge Prescriptions    No medications on file       No discharge procedures on file.    PDMP Review       None            ED Provider  Electronically Signed by             Joseph Pritchard DO  05/16/24 0526

## 2024-05-29 ENCOUNTER — HOSPITAL ENCOUNTER (OUTPATIENT)
Dept: MRI IMAGING | Facility: HOSPITAL | Age: 54
Discharge: HOME | End: 2024-05-29
Attending: STUDENT IN AN ORGANIZED HEALTH CARE EDUCATION/TRAINING PROGRAM
Payer: COMMERCIAL

## 2024-05-29 DIAGNOSIS — R70.0 ELEVATED ERYTHROCYTE SEDIMENTATION RATE: ICD-10-CM

## 2024-05-29 DIAGNOSIS — M79.641 PAIN OF RIGHT HAND: ICD-10-CM

## 2024-05-29 DIAGNOSIS — R22.31 LOCALIZED SWELLING ON RIGHT HAND: ICD-10-CM

## 2024-05-29 DIAGNOSIS — M19.041 ARTHRITIS OF RIGHT HAND: ICD-10-CM

## 2024-05-29 PROCEDURE — 73218 MRI UPPER EXTREMITY W/O DYE: CPT

## 2024-06-19 ENCOUNTER — TELEPHONE (OUTPATIENT)
Age: 54
End: 2024-06-19

## 2024-06-19 NOTE — TELEPHONE ENCOUNTER
Caller: Patient    Doctor: Jian    Reason for call: Calling regarding MRI; Patient had MRI 5/29 and was due in for Follow-Up to go over results.  Offered patient Fri 6/21, but patient states this day would not work for him.  Patient indicates Wednesdays, especially later in the afternoon, would work.    First available appointment matching this criteria not until 7/17; patient would like to know if there is any way he can get in sooner.  Please reach out to patient to advise.    Call back#: [phone]

## 2024-08-26 ENCOUNTER — TELEPHONE (OUTPATIENT)
Age: 54
End: 2024-08-26

## 2024-08-26 ENCOUNTER — TELEPHONE (OUTPATIENT)
Dept: OBGYN CLINIC | Facility: CLINIC | Age: 54
End: 2024-08-26

## 2024-08-26 NOTE — TELEPHONE ENCOUNTER
Pt contacted Call Center requested refill of their medication.        Doctor Name: Jian      Medication Name: Meloxicam       Dosage of Med: 15 mg      Frequency of Med: daily      Remaining Medication: zero      Pharmacy and Location: [facility]    -patient stated this morning he was informed there was a refill left but the pharmacy stated that is incorrect.    Pt. Preferred Callback Phone Number: [phone]      Thank you.       PLEASE ADVISE PATIENTS:    REFILL REQUESTS WILL BE PROCESSED WITHIN 24-48 HOURS.

## 2024-08-26 NOTE — TELEPHONE ENCOUNTER
Patient called asking if any refills for meloxicam were left. Patient made aware medication was refilled on 5/15/24 for 30 with 1 refills to [facility] pharmacy. Patient instructed to contact the pharmacy to obtain refills of medication. Patient verbalized understanding.  Advised last dispense was 5/15/24 and to call back if there are any issues with the pharmacy.

## 2024-08-27 DIAGNOSIS — M25.50 POLYARTHRALGIA: ICD-10-CM

## 2024-08-27 DIAGNOSIS — M25.60 JOINT STIFFNESS: ICD-10-CM

## 2024-08-27 DIAGNOSIS — M25.471 ANKLE SWELLING, RIGHT: ICD-10-CM

## 2024-08-27 DIAGNOSIS — R70.0 ELEVATED ERYTHROCYTE SEDIMENTATION RATE: ICD-10-CM

## 2024-08-27 DIAGNOSIS — M79.641 PAIN OF RIGHT HAND: ICD-10-CM

## 2024-08-27 DIAGNOSIS — R22.31 LOCALIZED SWELLING ON RIGHT HAND: ICD-10-CM

## 2024-08-27 DIAGNOSIS — M19.041 ARTHRITIS OF RIGHT HAND: ICD-10-CM

## 2024-08-27 DIAGNOSIS — M25.571 ACUTE RIGHT ANKLE PAIN: ICD-10-CM

## 2024-08-27 RX ORDER — MELOXICAM 15 MG/1
15 TABLET ORAL DAILY
Qty: 30 TABLET | Refills: 2 | Status: SHIPPED | OUTPATIENT
Start: 2024-08-27

## 2024-08-28 NOTE — TELEPHONE ENCOUNTER
Caller: Patient    Doctor: Jian    Reason for call: Patient calling to check on status of this as he has not heard from pharmacy.  Advised it was sent over yesterday.    Patient acknowledged, no further action at this time.    Call back#: N/A

## 2024-09-06 ENCOUNTER — DOCTOR'S OFFICE (OUTPATIENT)
Dept: URBAN - NONMETROPOLITAN AREA CLINIC 1 | Facility: CLINIC | Age: 54
Setting detail: OPHTHALMOLOGY
End: 2024-09-06
Payer: COMMERCIAL

## 2024-09-06 DIAGNOSIS — H35.373: ICD-10-CM

## 2024-09-06 DIAGNOSIS — H25.042: ICD-10-CM

## 2024-09-06 DIAGNOSIS — H25.21: ICD-10-CM

## 2024-09-06 DIAGNOSIS — H25.12: ICD-10-CM

## 2024-09-06 PROCEDURE — 92004 COMPRE OPH EXAM NEW PT 1/>: CPT | Performed by: OPHTHALMOLOGY

## 2024-09-06 PROCEDURE — 76512 OPH US DX B-SCAN: CPT | Mod: RT | Performed by: OPHTHALMOLOGY

## 2024-09-06 PROCEDURE — 92134 CPTRZ OPH DX IMG PST SGM RTA: CPT | Performed by: OPHTHALMOLOGY

## 2024-09-06 ASSESSMENT — VISUAL ACUITY
OS_BCVA: LP
OD_BCVA: 20/25

## 2024-09-06 ASSESSMENT — KERATOMETRY
OS_AXISANGLE_DEGREES: 015
OS_K1POWER_DIOPTERS: 43.50
OS_K2POWER_DIOPTERS: 44.00

## 2024-09-06 ASSESSMENT — REFRACTION_AUTOREFRACTION
OS_CYLINDER: -1.25
OS_SPHERE: +1.25
OS_AXIS: 089

## 2024-09-06 ASSESSMENT — CONFRONTATIONAL VISUAL FIELD TEST (CVF)
OS_FINDINGS: FULL
OD_FINDINGS: FULL

## 2024-09-18 ENCOUNTER — DOCTOR'S OFFICE (OUTPATIENT)
Dept: URBAN - NONMETROPOLITAN AREA CLINIC 1 | Facility: CLINIC | Age: 54
Setting detail: OPHTHALMOLOGY
End: 2024-09-18
Payer: COMMERCIAL

## 2024-09-18 DIAGNOSIS — H25.21: ICD-10-CM

## 2024-09-18 PROCEDURE — 76519 ECHO EXAM OF EYE: CPT | Mod: RT | Performed by: OPHTHALMOLOGY

## 2024-09-30 ENCOUNTER — AMBUL SURGICAL CARE (OUTPATIENT)
Dept: URBAN - NONMETROPOLITAN AREA SURGERY 1 | Facility: SURGERY | Age: 54
Setting detail: OPHTHALMOLOGY
End: 2024-09-30
Payer: COMMERCIAL

## 2024-09-30 DIAGNOSIS — H25.21: ICD-10-CM

## 2024-09-30 PROCEDURE — G8918 PT W/O PREOP ORDER IV AB PRO: HCPCS | Mod: RT | Performed by: OPHTHALMOLOGY

## 2024-09-30 PROCEDURE — G8907 PT DOC NO EVENTS ON DISCHARG: HCPCS | Mod: RT | Performed by: OPHTHALMOLOGY

## 2024-09-30 PROCEDURE — G8918 PT W/O PREOP ORDER IV AB PRO: HCPCS | Mod: RT | Performed by: CLINIC/CENTER

## 2024-09-30 PROCEDURE — G8907 PT DOC NO EVENTS ON DISCHARG: HCPCS | Mod: RT | Performed by: CLINIC/CENTER

## 2024-09-30 PROCEDURE — 66982 XCAPSL CTRC RMVL CPLX WO ECP: CPT | Mod: SG,RT | Performed by: CLINIC/CENTER

## 2024-09-30 PROCEDURE — 66982 XCAPSL CTRC RMVL CPLX WO ECP: CPT | Mod: RT | Performed by: OPHTHALMOLOGY

## 2024-10-01 ENCOUNTER — DOCTOR'S OFFICE (OUTPATIENT)
Dept: URBAN - NONMETROPOLITAN AREA CLINIC 1 | Facility: CLINIC | Age: 54
Setting detail: OPHTHALMOLOGY
End: 2024-10-01
Payer: COMMERCIAL

## 2024-10-01 ENCOUNTER — RX ONLY (RX ONLY)
Age: 54
End: 2024-10-01

## 2024-10-01 DIAGNOSIS — H25.042: ICD-10-CM

## 2024-10-01 DIAGNOSIS — H25.12: ICD-10-CM

## 2024-10-01 DIAGNOSIS — Z96.1: ICD-10-CM

## 2024-10-01 PROBLEM — H35.373 ERM; BOTH EYES: Status: ACTIVE | Noted: 2024-09-06

## 2024-10-01 PROCEDURE — 99024 POSTOP FOLLOW-UP VISIT: CPT | Performed by: OPHTHALMOLOGY

## 2024-10-01 ASSESSMENT — VISUAL ACUITY
OD_BCVA: 20/25
OS_BCVA: 20/60

## 2024-10-01 ASSESSMENT — KERATOMETRY
OS_K1POWER_DIOPTERS: 7.84
OS_AXISANGLE_DEGREES: 032
OS_K2POWER_DIOPTERS: 7.73

## 2024-10-01 ASSESSMENT — REFRACTION_AUTOREFRACTION
OS_CYLINDER: -1.25
OS_SPHERE: +1.25
OS_AXIS: 089

## 2024-10-23 ENCOUNTER — DOCTOR'S OFFICE (OUTPATIENT)
Dept: URBAN - NONMETROPOLITAN AREA CLINIC 1 | Facility: CLINIC | Age: 54
Setting detail: OPHTHALMOLOGY
End: 2024-10-23
Payer: COMMERCIAL

## 2024-10-23 DIAGNOSIS — H25.12: ICD-10-CM

## 2024-10-23 DIAGNOSIS — Z96.1: ICD-10-CM

## 2024-10-23 DIAGNOSIS — H25.042: ICD-10-CM

## 2024-10-23 PROCEDURE — 99024 POSTOP FOLLOW-UP VISIT: CPT | Performed by: OPHTHALMOLOGY

## 2024-10-23 ASSESSMENT — REFRACTION_AUTOREFRACTION
OD_AXIS: 129
OS_CYLINDER: -0.25
OD_SPHERE: +1.25
OS_SPHERE: +0.50
OD_CYLINDER: -1.25
OS_AXIS: 172

## 2024-10-23 ASSESSMENT — KERATOMETRY
OS_K2POWER_DIOPTERS: 44.25
OS_K1POWER_DIOPTERS: 43.75
OD_K1POWER_DIOPTERS: 43.50
OD_AXISANGLE_DEGREES: 29
OS_AXISANGLE_DEGREES: 180
OD_K2POWER_DIOPTERS: 45.00

## 2024-10-23 ASSESSMENT — VISUAL ACUITY
OS_BCVA: 20/30+1
OD_BCVA: 20/30+1

## 2024-10-24 ENCOUNTER — DOCTOR'S OFFICE (OUTPATIENT)
Dept: URBAN - NONMETROPOLITAN AREA CLINIC 1 | Facility: CLINIC | Age: 54
Setting detail: OPHTHALMOLOGY
End: 2024-10-24
Payer: COMMERCIAL

## 2024-10-24 DIAGNOSIS — H25.042: ICD-10-CM

## 2024-10-24 DIAGNOSIS — H25.12: ICD-10-CM

## 2024-10-24 PROCEDURE — 92136 OPHTHALMIC BIOMETRY: CPT | Mod: 26,LT | Performed by: OPHTHALMOLOGY

## 2024-10-30 ENCOUNTER — TELEPHONE (OUTPATIENT)
Age: 54
End: 2024-10-30

## 2024-11-05 RX ORDER — OFLOXACIN 3 MG/ML
SOLUTION/ DROPS OPHTHALMIC
COMMUNITY
Start: 2024-09-18

## 2024-11-05 RX ORDER — PREDNISOLONE ACETATE 10 MG/ML
SUSPENSION/ DROPS OPHTHALMIC
COMMUNITY
Start: 2024-10-24

## 2024-11-05 RX ORDER — SILICONE ADHESIVE 1.5" X 3"
SHEET (EA) TOPICAL
COMMUNITY
Start: 2024-09-19

## 2024-11-05 RX ORDER — KETOROLAC TROMETHAMINE 5 MG/ML
SOLUTION OPHTHALMIC
COMMUNITY
Start: 2024-09-18

## 2024-11-06 ENCOUNTER — HOSPITAL ENCOUNTER (OUTPATIENT)
Dept: RADIOLOGY | Facility: CLINIC | Age: 54
Discharge: HOME | End: 2024-11-06
Payer: COMMERCIAL

## 2024-11-06 ENCOUNTER — OFFICE VISIT (OUTPATIENT)
Dept: OBGYN CLINIC | Facility: CLINIC | Age: 54
End: 2024-11-06
Payer: COMMERCIAL

## 2024-11-06 VITALS
DIASTOLIC BLOOD PRESSURE: 98 MMHG | BODY MASS INDEX: 40.18 KG/M2 | TEMPERATURE: 98.5 F | SYSTOLIC BLOOD PRESSURE: 158 MMHG | OXYGEN SATURATION: 95 % | HEART RATE: 96 BPM | RESPIRATION RATE: 18 BRPM | WEIGHT: 256 LBS | HEIGHT: 67 IN

## 2024-11-06 DIAGNOSIS — M25.561 ACUTE PAIN OF RIGHT KNEE: ICD-10-CM

## 2024-11-06 DIAGNOSIS — M25.562 ACUTE PAIN OF LEFT KNEE: Primary | ICD-10-CM

## 2024-11-06 DIAGNOSIS — M17.12 PRIMARY OSTEOARTHRITIS OF LEFT KNEE: ICD-10-CM

## 2024-11-06 PROCEDURE — 73564 X-RAY EXAM KNEE 4 OR MORE: CPT

## 2024-11-06 PROCEDURE — 99213 OFFICE O/P EST LOW 20 MIN: CPT | Performed by: STUDENT IN AN ORGANIZED HEALTH CARE EDUCATION/TRAINING PROGRAM

## 2024-11-06 PROCEDURE — 20610 DRAIN/INJ JOINT/BURSA W/O US: CPT | Performed by: STUDENT IN AN ORGANIZED HEALTH CARE EDUCATION/TRAINING PROGRAM

## 2024-11-06 RX ORDER — LIDOCAINE HYDROCHLORIDE 10 MG/ML
2 INJECTION, SOLUTION INFILTRATION; PERINEURAL
Status: COMPLETED | OUTPATIENT
Start: 2024-11-06 | End: 2024-11-06

## 2024-11-06 RX ORDER — TRIAMCINOLONE ACETONIDE 40 MG/ML
40 INJECTION, SUSPENSION INTRA-ARTICULAR; INTRAMUSCULAR
Status: COMPLETED | OUTPATIENT
Start: 2024-11-06 | End: 2024-11-06

## 2024-11-06 RX ADMIN — TRIAMCINOLONE ACETONIDE 40 MG: 40 INJECTION, SUSPENSION INTRA-ARTICULAR; INTRAMUSCULAR at 13:00

## 2024-11-06 RX ADMIN — LIDOCAINE HYDROCHLORIDE 2 ML: 10 INJECTION, SOLUTION INFILTRATION; PERINEURAL at 13:00

## 2024-11-06 NOTE — PROGRESS NOTES
1. Acute pain of left knee  Large joint arthrocentesis: L knee    CANCELED: XR knee 4+ vw right injury      2. Primary osteoarthritis of left knee  Large joint arthrocentesis: L knee        Orders Placed This Encounter   Procedures    Large joint arthrocentesis: L knee        Imaging Studies (I personally reviewed images in PACS and report):    X-ray left knee 11/6/2024: No acute osseous abnormalities.  Severe medial tibiofemoral joint space narrowing/osteoarthritic changes.  There are marginal osteophytes.  Small joint effusion    IMPRESSION:  Acute atraumatic bilateral knee pain, left worse than right  Severe osteoarthritic changes of the medial tibiofemoral joint line of the knee on radiographic imaging  Clinical history exam seems consistent with osteoarthritic pain      Other factors:  BMI 40  Unspecified polyarthralgia.  Referred to rheumatology in the past but patient admits he has not followed up    PLAN:    Clinical exam and radiographic imaging reviewed with patient today, with impression as per above. I have discussed with the patient the pathophysiology of this diagnosis and reviewed how the examination correlates with this diagnosis.    Imaging obtained/reviewed as per above. I will follow up official radiology interpretation.  Treatment options were discussed at length, including risks and benefits; after discussing these treatment options, the patient elected for palpation guided left knee intra-articular cortisone injection as per procedure note below.  Counseled we can repeat the injection every 3 to 4 months as needed in regards to pain control.  Alternatively, could consider a viscosupplementation injection if there is less than 3 months relief from cortisone.  Other conservative treatment modalities for knee arthritic pain include Tylenol, NSAIDs, heat/ice therapy.  I counseled use of a pillow at night between his legs while sleeping.  Ultimately, if conservative treatments as per above do not  "provide relief, could be consideration for knee replacement as well.  Patient prefers to defer this option for as long as possible.    Return if symptoms worsen or fail to improve.    Portions of the record may have been created with voice recognition software. Occasional wrong word or \"sound a like\" substitutions may have occurred due to the inherent limitations of voice recognition software. Read the chart carefully and recognize, using context, where substitutions have occurred.     CHIEF COMPLAINT:  Left knee pain      HPI:  Eric Quinn is a 54 y.o. male  who presents for       Visit 11/6/2024:  Initial evaluation of bilateral knee pain, left worse than right  Patient reports previously right knee was worse but now this has been improving.  He states there has been persistence of left medial sided knee pain  Denies any precipitating injury but states he was performing activity in which he was climbing up several flights of stairs for a few hours which was much more than he usually does.  He states towards the end of this activity, he reports a sharp stabbing pain along his anterior medial knee bilaterally.  He states was worse with prolonged weightbearing.  He reports stiffness, swelling of his knees.  He treated with use of meloxicam, acetaminophen, icing which did help his right knee pain but has not noticed any difference for his left knee.  He denies prior surgeries of his left knee.  Denies imaging of his knee since pain started.          Medical, Surgical, Family, and Social History    Past Medical History:   Diagnosis Date    Cancer (HCC)     kidney 2012    H/O cervical fracture      Past Surgical History:   Procedure Laterality Date    HERNIA REPAIR      NEPHRECTOMY Left      Social History   Social History     Substance and Sexual Activity   Alcohol Use Not Currently     Social History     Substance and Sexual Activity   Drug Use Yes    Types: Marijuana     Social History     Tobacco Use   Smoking " "Status Every Day    Current packs/day: 0.50    Types: Cigarettes    Passive exposure: Past   Smokeless Tobacco Never     Family History   Problem Relation Age of Onset    No Known Problems Mother      No Known Allergies       Physical Exam  /98 (BP Location: Left arm, Patient Position: Sitting, Cuff Size: Adult)   Pulse 96   Temp 98.5 °F (36.9 °C)   Resp 18   Ht 5' 7\" (1.702 m)   Wt 116 kg (256 lb)   SpO2 95%   BMI 40.10 kg/m²     Constitutional:  see vital signs  Gen: BMI 40, normocephalic/atraumatic, well-groomed  Eyes: No inflammation or discharge of conjunctiva or lids; sclera clear   Pharynx: no inflammation, lesion, or mass of lips  Pulmonary/Chest: Effort normal. No respiratory distress.     Ortho Exam  Left Knee Exam:  Erythema: no  Swelling: no  Increased Warmth: no  Tenderness: +MJL  ROM: 0-130  Knee flexion strength: 5/5  Knee extension strength: 5-/5  Patellar Grind: +  Varus laxity: negative  Valgus laxity: negative, but positive pain      Large joint arthrocentesis: L knee  Universal Protocol:  procedure performed by consultantConsent: Verbal consent obtained.  Risks and benefits: risks, benefits and alternatives were discussed  Consent given by: patient  Time out: Immediately prior to procedure a \"time out\" was called to verify the correct patient, procedure, equipment, support staff and site/side marked as required.  Patient understanding: patient states understanding of the procedure being performed  Site marked: the operative site was marked  Radiology Images displayed and confirmed. If images not available, report reviewed: imaging studies available  Patient identity confirmed: verbally with patient  Supporting Documentation  Indications: pain   Procedure Details  Location: knee - L knee  Preparation: Patient was prepped and draped in the usual sterile fashion  Needle size: 22 G  Ultrasound guidance: no  Approach: anterolateral  Medications administered: 40 mg triamcinolone acetonide " 40 mg/mL; 2 mL lidocaine 1 %    Patient tolerance: patient tolerated the procedure well with no immediate complications  Dressing:  Sterile dressing applied

## 2024-11-06 NOTE — PATIENT INSTRUCTIONS
"Patient Education     Steroid injection   The Basics   Written by the doctors and editors at [facility]   What is a steroid injection? -- Steroids, also known as \"glucocorticoids,\" are medicines that help reduce swelling and pain. Doctors sometimes inject a steroid medicine into a joint or other part of the body to relieve pain. This is also sometimes called a \"cortisone shot.\"  After the injection, the steroid starts to work within a few days.  How long does a steroid injection work? -- It depends on the person and where the injection is given. For some people, the effects of a steroid injection can last for a few weeks or longer.  Sometimes, the doctor also injects a medicine called a \"local anesthetic\" with the steroid. This might help relieve pain until the steroid starts to work.  A steroid injection can help with pain, but it won't cure the problem that is causing the pain.  How do I prepare for a steroid injection? -- The doctor or nurse will tell you if you need to do anything special to prepare. Before your procedure, your doctor will do an exam.  Your doctor will also ask you about your \"health history.\" This involves asking you questions about any health problems you have or had in the past, past surgeries, and any medicines you take. Tell them about:   Any medicines you are taking - This includes any prescription or \"over-the-counter\" medicines you use, plus any herbal supplements you take. It helps to write down and bring a list of any medicines you take, or bring a bag with all of your medicines with you.   Any allergies you have   Any bleeding problems you have   Any other steroid injections you have had  Ask the doctor or nurse if you have questions or if there is anything you do not understand.  How is a steroid injection given? -- When it is time for the injection:   The doctor will clean the skin over the area where they plan to give the shot. (This is called the \"injection site.\")   They might use " ultrasound or a special kind of X-ray during the procedure. This is to check where to give the steroid.   Sometimes, they might give a shot of medicine to numb the skin.   They will inject the steroid.   Then, they will take out the needle and cover the injection site with a bandage.  What happens after a steroid injection? -- You can go home after the injection.  For the next few days, you might want to:   Apply a cold gel pack, bag of ice, or bag of frozen vegetables to the injection site every 1 to 2 hours, for 15 minutes each time. Put a thin towel between the ice (or other cold object) and your skin.   Rest the treated body part for a few days.   Take medicines to relieve pain. Examples include acetaminophen (sample brand name: Tylenol), ibuprofen (sample brand names: Advil, Motrin), or naproxen (sample brand name: Aleve).  If you have diabetes, the doctor might want you to check your blood sugar levels more often for a few days. The steroid medicine might temporarily raise your blood sugar.  What are the risks of a steroid injection? -- Your doctor will talk to you about all of the possible risks, and answer your questions. Possible risks include:   Bleeding, bruising, or soreness at the injection site   Damage to parts near the injection site - This might include cartilage damage, injury to nerves, tendon rupture, or thinning of skin and bones.   Skin around the injection site becoming lighter or whiter in color   Infection   Health conditions like diabetes or high blood pressure getting worse for a few days   Allergic reaction to the medicine  What else should I know? -- Most of the time, doctors limit the total number of steroid injections to a certain area.  A steroid injection might be only 1 part of your treatment plan. Take your other medicines as instructed. Also, follow your doctor's recommendations about other treatments. These might include things like physical therapy or devices like a brace or  cane.  All topics are updated as new evidence becomes available and our peer review process is complete.  This topic retrieved from [employer] on: Apr 25, 2024.  Topic 937569 Version 2.0  Release: 32.4.2 - C32.114  © 2024 [employer] and/or its affiliates. All rights reserved.  Consumer Information Use and Disclaimer   Disclaimer: This generalized information is a limited summary of diagnosis, treatment, and/or medication information. It is not meant to be comprehensive and should be used as a tool to help the user understand and/or assess potential diagnostic and treatment options. It does NOT include all information about conditions, treatments, medications, side effects, or risks that may apply to a specific patient. It is not intended to be medical advice or a substitute for the medical advice, diagnosis, or treatment of a health care provider based on the health care provider's examination and assessment of a patient's specific and unique circumstances. Patients must speak with a health care provider for complete information about their health, medical questions, and treatment options, including any risks or benefits regarding use of medications. This information does not endorse any treatments or medications as safe, effective, or approved for treating a specific patient. Up[employer] Inc[employer] and its affiliates disclaim any warranty or liability relating to this information or the use thereof.The use of this information is governed by the Terms of Use, available at https://www.woltersMakstruwer.com/en/know/clinical-effectiveness-terms. 2024© Up[employer]Date, Inc. and its affiliates and/or licensors. All rights reserved.  Copyright   © 2024 [employer]Date, [employer] and/or its affiliates. All rights reserved.

## 2024-11-14 ENCOUNTER — TELEPHONE (OUTPATIENT)
Age: 54
End: 2024-11-14

## 2024-11-14 NOTE — TELEPHONE ENCOUNTER
Called pt to relay Dr Villar's message, pts mom answered and said he wasn't home right now advised to call back later.

## 2024-11-14 NOTE — TELEPHONE ENCOUNTER
I called patient Eric and told him the advise Dr. Villar wanted him to go to Emergency room or urgent care near him. Eric was saying it's hot to the touch and red in and around the knee. I spoke with him and gave him this advise. He said he was going to go to [facility]'s urgent care. I offered him Dr. Villar schedule if he wanted to wait for another day. We are all booked up at [facility] and we could Not accommodate him into the schedule today. [place] is from [place] And usually see dr. Villar in [place]. I told him he will be there tomorrow and offered several times He said he had decided to go to urgent care and take care of it.

## 2024-11-14 NOTE — TELEPHONE ENCOUNTER
Caller: Pt    Doctor: Jian     Reason for call: pt received a CSI in the L Knee on 11/6. States he is in 12/10 pain. Some swelling, no discoloration, warmer than usual. No recent injury     Requesting a call back from the doctor     Call back#: [phone]

## 2024-11-14 NOTE — TELEPHONE ENCOUNTER
Called and spoke w/pt and he had CSI left knee on 11/6/24 and pain has gotten worse since then. States feels like it is popping out of place. Pain level 12/10. Taking Ibuprofen 800mg 2tabs at a time. States has hx of kidney CA and had part of kidney removed. Informed that Ibuprofen is hard on kidneys and he would not want to cause harm to his kidneys by taking too much. So, he should proceed to ED given that he has severe pain and also states he can hardly bear weight.  States knee is red, sore, warm to touch. He has been elevating and icing. Also help lift his mother w/mariann lift. Pt states he will take my advice and go to ED. I did inform him that I would be forwarding this to Dr Villar for review and advice.

## 2025-01-22 ENCOUNTER — HOSPITAL ENCOUNTER (OUTPATIENT)
Dept: RADIOLOGY | Facility: CLINIC | Age: 55
Discharge: HOME | End: 2025-01-22
Payer: COMMERCIAL

## 2025-01-22 VITALS — WEIGHT: 271.6 LBS | BODY MASS INDEX: 42.63 KG/M2 | HEIGHT: 67 IN

## 2025-01-22 DIAGNOSIS — M25.571 ACUTE RIGHT ANKLE PAIN: ICD-10-CM

## 2025-01-22 DIAGNOSIS — G89.29 BILATERAL CHRONIC KNEE PAIN: ICD-10-CM

## 2025-01-22 DIAGNOSIS — R70.0 ELEVATED ERYTHROCYTE SEDIMENTATION RATE: ICD-10-CM

## 2025-01-22 DIAGNOSIS — M25.60 JOINT STIFFNESS: ICD-10-CM

## 2025-01-22 DIAGNOSIS — M25.471 ANKLE SWELLING, RIGHT: ICD-10-CM

## 2025-01-22 DIAGNOSIS — M79.641 PAIN OF RIGHT HAND: ICD-10-CM

## 2025-01-22 DIAGNOSIS — M25.561 BILATERAL CHRONIC KNEE PAIN: ICD-10-CM

## 2025-01-22 DIAGNOSIS — G89.29 BILATERAL CHRONIC KNEE PAIN: Primary | ICD-10-CM

## 2025-01-22 DIAGNOSIS — M17.0 PRIMARY OSTEOARTHRITIS OF BOTH KNEES: ICD-10-CM

## 2025-01-22 DIAGNOSIS — M19.041 ARTHRITIS OF RIGHT HAND: ICD-10-CM

## 2025-01-22 DIAGNOSIS — M25.50 POLYARTHRALGIA: ICD-10-CM

## 2025-01-22 DIAGNOSIS — M25.562 BILATERAL CHRONIC KNEE PAIN: Primary | ICD-10-CM

## 2025-01-22 DIAGNOSIS — R22.31 LOCALIZED SWELLING ON RIGHT HAND: ICD-10-CM

## 2025-01-22 DIAGNOSIS — M25.561 BILATERAL CHRONIC KNEE PAIN: Primary | ICD-10-CM

## 2025-01-22 DIAGNOSIS — M25.562 BILATERAL CHRONIC KNEE PAIN: ICD-10-CM

## 2025-01-22 PROCEDURE — 73562 X-RAY EXAM OF KNEE 3: CPT

## 2025-01-22 PROCEDURE — 99214 OFFICE O/P EST MOD 30 MIN: CPT | Performed by: STUDENT IN AN ORGANIZED HEALTH CARE EDUCATION/TRAINING PROGRAM

## 2025-01-22 PROCEDURE — 20610 DRAIN/INJ JOINT/BURSA W/O US: CPT | Performed by: STUDENT IN AN ORGANIZED HEALTH CARE EDUCATION/TRAINING PROGRAM

## 2025-01-22 RX ORDER — MELOXICAM 15 MG/1
15 TABLET ORAL DAILY
Qty: 30 TABLET | Refills: 2 | Status: SHIPPED | OUTPATIENT
Start: 2025-01-22

## 2025-01-22 RX ADMIN — TRIAMCINOLONE ACETONIDE 40 MG: 40 INJECTION, SUSPENSION INTRA-ARTICULAR; INTRAMUSCULAR at 14:45

## 2025-01-22 RX ADMIN — BUPIVACAINE HYDROCHLORIDE 2 ML: 2.5 INJECTION, SOLUTION INFILTRATION; PERINEURAL at 14:45

## 2025-01-22 NOTE — PROGRESS NOTES
1. Bilateral chronic knee pain  Large joint arthrocentesis: bilateral knee    XR knee 3 vw right non injury    Injection Procedure Prior Authorization      2. Primary osteoarthritis of both knees  Large joint arthrocentesis: bilateral knee    XR knee 3 vw right non injury    Injection Procedure Prior Authorization      3. Polyarthralgia  Ambulatory Referral to Rheumatology    meloxicam (Mobic) 15 mg tablet      4. Elevated erythrocyte sedimentation rate  Ambulatory Referral to Rheumatology    meloxicam (Mobic) 15 mg tablet      5. Joint stiffness  Ambulatory Referral to Rheumatology    meloxicam (Mobic) 15 mg tablet      6. Acute right ankle pain  meloxicam (Mobic) 15 mg tablet      7. Ankle swelling, right  meloxicam (Mobic) 15 mg tablet      8. Localized swelling on right hand  meloxicam (Mobic) 15 mg tablet      9. Pain of right hand  meloxicam (Mobic) 15 mg tablet      10. Arthritis of right hand  meloxicam (Mobic) 15 mg tablet          Orders Placed This Encounter   Procedures    Large joint arthrocentesis: bilateral knee    XR knee 3 vw right non injury    Injection Procedure Prior Authorization    Ambulatory Referral to Rheumatology        Imaging Studies (I personally reviewed images in PACS and report):    X-ray right knee 1/23/2025: No acute osseous abnormalities.  Small joint effusion.  Severe medial tibiofemoral joint space narrowing.  Varus angulation secondarily.  Small osteophytes of the lateral tibial plateau and femoral epicondyle.  X-ray left knee 11/6/2024: No acute osseous abnormalities.  Severe medial tibiofemoral joint space narrowing/osteoarthritic changes.  There are marginal osteophytes.  Small joint effusion  MRI right hand without contrast 5/29/2024:  SUBCUTANEOUS TISSUES: Intermediate signal noted in both collateral ligaments of the fourth MCP     FLEXOR/EXTENSOR TENDONS: Increased fluid noted within the fourth and sixth extensor     MUSCULATURE: Normal.     ARTICULAR SURFACES:  Normal.      BONES: Bone marrow edema noted within the distal carpal row as well as metacarpal bases 3-5, and to a lesser extent the second. No joint space narrowing at these sites. No fracture.     Moderate osteoarthritic changes at the basal thumb     Interphalangeal joints are predominantly preserved     IMPRESSION:     Inflammatory changes as described, including bone marrow edema and tenosynovitis. Suspect inflammatory arthropathy. Please note that a more indolent septic arthritis could have a similar appearance.    IMPRESSION:  Acute atraumatic bilateral knee pain, left worse than right  Severe osteoarthritic changes of the medial tibiofemoral joint line of the knee on radiographic imaging  Clinical history exam seems consistent with primary osteoarthritic pain  Limited relief from intra-articular cortisone injections from last visit      Other factors:  BMI 42  Tobacco use disorder  Unspecified polyarthralgia.  Referred to rheumatology in the past but patient admits he has not followed up.  He has had some autoimmune workup which did not note elevated ESR and CRP    PLAN:    Clinical exam and radiographic imaging reviewed with patient today, with impression as per above. I have discussed with the patient the pathophysiology of this diagnosis and reviewed how the examination correlates with this diagnosis.    Imaging obtained/reviewed as per above. I will follow up official radiology interpretation.  Given his limited response to intra-articular cortisone injection, I did offer that we can attempt a viscosupplementation injection of his bilateral knees to see if we can sustain further relief.  Patient open to this plan and order was placed.  In the interim, I did offer repeat intra-articular cortisone injection today to provide some degree of relief while waiting for viscosupplementation injection.  After reviewing risk/benefits patient was agreeable to this as well as per procedure note below.  Other conservative  "treatment modalities for knee arthritic pain include Tylenol, NSAIDs, heat/ice therapy.  I did refill his meloxicam 15 mg p.o. daily.    Ultimately, if conservative treatments as per above do not provide relief, could be consideration for knee replacement as well.  Patient prefers to defer this option for as long as possible.  Separately, we did discuss again about his prior history of polyarthralgia and hand stiffness.  He did have an elevated ESR and CRP suggestive of a potential autoimmune aspect of pain he has been experiencing.  Patient noted he did not follow-up with rheumatology but he is open to trying again if he can get a new referral.  Order placed    Return for Follow up with me after approval of gel injections; follow up separately with rheumatology .    Portions of the record may have been created with voice recognition software. Occasional wrong word or \"sound a like\" substitutions may have occurred due to the inherent limitations of voice recognition software. Read the chart carefully and recognize, using context, where substitutions have occurred.     CHIEF COMPLAINT:  Chief Complaint   Patient presents with    Left Knee - Pain           HPI:  rEic Quinn is a 54 y.o. male  who presents for     Visit 1/22/2025:  Follow-up bilateral knee pain, left worse than right  Last seen on 11/6/2024 when she was given an intra-articular cortisone injection of his left knee.    History of severe medial tibiofemoral joint space osteoarthritis.  Patient states he did not sustain much relief and thinks it may have only lasted a few weeks.  He now feels the pain is worsening of his bilateral knees over time.  He states again the pain is mainly over the medial aspect of his knee and describes as sharp/aching pain of moderate to severe intensity.  Pain is worse with walking and transitioning from sitting to standing.  He is unable to squat or kneel without severely exacerbating the pain.  He reports stiffness " with knee range of motion movements.  He reports crepitus of his knees.  Denies any new injuries of his knee since last visit    Separately, patient has been seen in the past in regards to polyarthralgia as he did have an atraumatic swelling and stiffness of his right hand previously.  He seemed to respond to steroids and meloxicam 15 mg p.o. daily.  Patient states he feels is the only medication that provides some degree of relief and reduction of swelling of his hands, wrists, fingers and ankles.  He admits he did not see rheumatology after referral was placed previously.  I did have some lab work done on him which revealed elevated ESR and CRP.  MRI of his right hand also noted findings that would be suggestive for an inflammatory arthropathy.    Visit 11/6/2024:  Initial evaluation of bilateral knee pain, left worse than right  Patient reports previously right knee was worse but now this has been improving.  He states there has been persistence of left medial sided knee pain  Denies any precipitating injury but states he was performing activity in which he was climbing up several flights of stairs for a few hours which was much more than he usually does.  He states towards the end of this activity, he reports a sharp stabbing pain along his anterior medial knee bilaterally.  He states was worse with prolonged weightbearing.  He reports stiffness, swelling of his knees.  He treated with use of meloxicam, acetaminophen, icing which did help his right knee pain but has not noticed any difference for his left knee.  He denies prior surgeries of his left knee.  Denies imaging of his knee since pain started.          Medical, Surgical, Family, and Social History    Past Medical History:   Diagnosis Date    Cancer (HCC)     kidney 2012    H/O cervical fracture      Past Surgical History:   Procedure Laterality Date    HERNIA REPAIR      NEPHRECTOMY Left      Social History   Social History     Substance and Sexual  "Activity   Alcohol Use Not Currently     Social History     Substance and Sexual Activity   Drug Use Yes    Types: Marijuana     Social History     Tobacco Use   Smoking Status Every Day    Current packs/day: 0.50    Types: Cigarettes    Passive exposure: Past   Smokeless Tobacco Never     Family History   Problem Relation Age of Onset    No Known Problems Mother      No Known Allergies       Physical Exam  Ht 5' 7\" (1.702 m)   Wt 123 kg (271 lb 9.6 oz)   BMI 42.54 kg/m²     Constitutional:  see vital signs  Gen: BMI 42, normocephalic/atraumatic, well-groomed  Eyes: No inflammation or discharge of conjunctiva or lids; sclera clear   Pharynx: no inflammation, lesion, or mass of lips  Pulmonary/Chest: Effort normal. No respiratory distress.     Ortho Exam  Left Knee Exam:  Erythema: no  Swelling: no  Increased Warmth: no  Tenderness: +MJL  ROM: 0-130  Knee flexion strength: 5/5  Knee extension strength: 5-/5  Patellar Grind: +  Varus laxity: negative  Valgus laxity: negative, but positive pain    Right Knee Exam:  Erythema: no  Swelling: no  Increased Warmth: no  Tenderness: +MJL  ROM: 0-130  Knee flexion strength: 5/5  Knee extension strength: 5-/5  Patellar Grind: +  Varus laxity: negative  Valgus laxity: negative, but positive pain      Large joint arthrocentesis: bilateral knee  Universal Protocol:  procedure performed by consultantConsent: Verbal consent obtained.  Risks and benefits: risks, benefits and alternatives were discussed  Consent given by: patient  Time out: Immediately prior to procedure a \"time out\" was called to verify the correct patient, procedure, equipment, support staff and site/side marked as required.  Patient understanding: patient states understanding of the procedure being performed  Site marked: the operative site was marked  Radiology Images displayed and confirmed. If images not available, report reviewed: imaging studies available  Patient identity confirmed: verbally with " patient  Supporting Documentation  Indications: pain   Procedure Details  Location: knee - bilateral knee  Preparation: Patient was prepped and draped in the usual sterile fashion  Needle size: 22 G  Ultrasound guidance: no  Approach: anterolateral    Medications (Right): 2 mL bupivacaine 0.25 %; 40 mg triamcinolone acetonide 40 mg/mLMedications (Left): 2 mL bupivacaine 0.25 %; 40 mg triamcinolone acetonide 40 mg/mL   Patient tolerance: patient tolerated the procedure well with no immediate complications  Dressing:  Sterile dressing applied

## 2025-01-24 RX ORDER — TRIAMCINOLONE ACETONIDE 40 MG/ML
40 INJECTION, SUSPENSION INTRA-ARTICULAR; INTRAMUSCULAR
Status: COMPLETED | OUTPATIENT
Start: 2025-01-22 | End: 2025-01-22

## 2025-01-24 RX ORDER — BUPIVACAINE HYDROCHLORIDE 2.5 MG/ML
2 INJECTION, SOLUTION INFILTRATION; PERINEURAL
Status: COMPLETED | OUTPATIENT
Start: 2025-01-22 | End: 2025-01-22